# Patient Record
Sex: FEMALE | Race: WHITE | NOT HISPANIC OR LATINO | Employment: FULL TIME | URBAN - METROPOLITAN AREA
[De-identification: names, ages, dates, MRNs, and addresses within clinical notes are randomized per-mention and may not be internally consistent; named-entity substitution may affect disease eponyms.]

---

## 2021-10-22 ENCOUNTER — NURSE ONLY (OUTPATIENT)
Dept: FAMILY MEDICINE | Age: 36
End: 2021-10-22

## 2021-10-22 DIAGNOSIS — Z23 NEED FOR VACCINATION: Primary | ICD-10-CM

## 2021-10-22 PROCEDURE — 90686 IIV4 VACC NO PRSV 0.5 ML IM: CPT

## 2021-10-22 PROCEDURE — 90471 IMMUNIZATION ADMIN: CPT

## 2022-06-24 ENCOUNTER — LAB ENCOUNTER (OUTPATIENT)
Dept: LAB | Age: 37
End: 2022-06-24
Attending: OBSTETRICS & GYNECOLOGY
Payer: COMMERCIAL

## 2022-06-24 ENCOUNTER — OFFICE VISIT (OUTPATIENT)
Dept: OBGYN CLINIC | Facility: CLINIC | Age: 37
End: 2022-06-24
Payer: COMMERCIAL

## 2022-06-24 VITALS
DIASTOLIC BLOOD PRESSURE: 60 MMHG | BODY MASS INDEX: 29.35 KG/M2 | SYSTOLIC BLOOD PRESSURE: 120 MMHG | HEIGHT: 66 IN | WEIGHT: 182.63 LBS

## 2022-06-24 DIAGNOSIS — Z01.419 ENCOUNTER FOR GYNECOLOGICAL EXAMINATION WITHOUT ABNORMAL FINDING: ICD-10-CM

## 2022-06-24 DIAGNOSIS — Z13.220 SCREENING, LIPID: ICD-10-CM

## 2022-06-24 DIAGNOSIS — Z13.1 SCREENING FOR DIABETES MELLITUS (DM): ICD-10-CM

## 2022-06-24 DIAGNOSIS — Z13.29 THYROID DISORDER SCREENING: ICD-10-CM

## 2022-06-24 DIAGNOSIS — R92.2 DENSE BREAST: Primary | ICD-10-CM

## 2022-06-24 LAB
CHOLEST SERPL-MCNC: 181 MG/DL (ref ?–200)
FASTING PATIENT GLUCOSE ANSWER: YES
FASTING PATIENT LIPID ANSWER: YES
GLUCOSE BLD-MCNC: 87 MG/DL (ref 70–99)
HDLC SERPL-MCNC: 69 MG/DL (ref 40–59)
LDLC SERPL CALC-MCNC: 97 MG/DL (ref ?–100)
NONHDLC SERPL-MCNC: 112 MG/DL (ref ?–130)
TRIGL SERPL-MCNC: 80 MG/DL (ref 30–149)
TSI SER-ACNC: 1.92 MIU/ML (ref 0.36–3.74)
VLDLC SERPL CALC-MCNC: 13 MG/DL (ref 0–30)

## 2022-06-24 PROCEDURE — 82947 ASSAY GLUCOSE BLOOD QUANT: CPT

## 2022-06-24 PROCEDURE — 84443 ASSAY THYROID STIM HORMONE: CPT

## 2022-06-24 PROCEDURE — 80061 LIPID PANEL: CPT

## 2022-06-24 RX ORDER — NORETHINDRONE ACETATE AND ETHINYL ESTRADIOL, ETHINYL ESTRADIOL AND FERROUS FUMARATE 1MG-10(24)
1 KIT ORAL DAILY
COMMUNITY
Start: 2022-03-25 | End: 2022-06-24

## 2022-06-24 RX ORDER — NORETHINDRONE ACETATE AND ETHINYL ESTRADIOL, ETHINYL ESTRADIOL AND FERROUS FUMARATE 1MG-10(24)
1 KIT ORAL DAILY
Qty: 84 TABLET | Refills: 4 | Status: SHIPPED | OUTPATIENT
Start: 2022-06-24

## 2022-07-08 LAB — HPV I/H RISK 1 DNA SPEC QL NAA+PROBE: NEGATIVE

## 2022-07-13 ENCOUNTER — PATIENT MESSAGE (OUTPATIENT)
Dept: OBGYN CLINIC | Facility: CLINIC | Age: 37
End: 2022-07-13

## 2022-07-13 NOTE — TELEPHONE ENCOUNTER
From: Grecia Nguyen  To: Jeff Parham MD  Sent: 7/13/2022 5:21 PM CDT  Subject: Birth Control    My insurance will only cover generic LoLo. Can you please submit the Rx for the generic. Thank you!

## 2022-07-13 NOTE — TELEPHONE ENCOUNTER
Call placed to pharmacy on patient behalf. Advised by pharmacy there is no generic for LoLoEstrin. Pharmacist indicates formerly [facility] 1/20 FE is closest comparable Rx . Call placed to patient. Offered patient option of brand LoLoEstrin via wholesome tawnya or Blisovi or other comparable medication. Patient indicates preference for comparable medication. routed to provider for consideration.

## 2022-07-21 RX ORDER — NORETHINDRONE ACETATE AND ETHINYL ESTRADIOL 1MG-20(24)
1 KIT ORAL DAILY
Qty: 84 TABLET | Refills: 4 | Status: SHIPPED | OUTPATIENT
Start: 2022-07-21 | End: 2023-07-21

## 2023-06-27 ENCOUNTER — OFFICE VISIT (OUTPATIENT)
Dept: OBGYN CLINIC | Facility: CLINIC | Age: 38
End: 2023-06-27

## 2023-06-27 ENCOUNTER — LAB ENCOUNTER (OUTPATIENT)
Dept: LAB | Age: 38
End: 2023-06-27
Attending: OBSTETRICS & GYNECOLOGY
Payer: COMMERCIAL

## 2023-06-27 VITALS
WEIGHT: 183 LBS | DIASTOLIC BLOOD PRESSURE: 78 MMHG | HEIGHT: 66 IN | SYSTOLIC BLOOD PRESSURE: 120 MMHG | BODY MASS INDEX: 29.41 KG/M2

## 2023-06-27 DIAGNOSIS — Z12.31 ENCOUNTER FOR SCREENING MAMMOGRAM FOR BREAST CANCER: ICD-10-CM

## 2023-06-27 DIAGNOSIS — Z80.3 FAMILY HISTORY OF BREAST CANCER: ICD-10-CM

## 2023-06-27 DIAGNOSIS — Z13.220 SCREENING, LIPID: ICD-10-CM

## 2023-06-27 DIAGNOSIS — Z01.419 ENCOUNTER FOR WELL WOMAN EXAM WITH ROUTINE GYNECOLOGICAL EXAM: Primary | ICD-10-CM

## 2023-06-27 LAB
CHOLEST SERPL-MCNC: 179 MG/DL (ref ?–200)
FASTING PATIENT LIPID ANSWER: NO
HDLC SERPL-MCNC: 60 MG/DL (ref 40–59)
LDLC SERPL CALC-MCNC: 102 MG/DL (ref ?–100)
NONHDLC SERPL-MCNC: 119 MG/DL (ref ?–130)
TRIGL SERPL-MCNC: 92 MG/DL (ref 30–149)
VLDLC SERPL CALC-MCNC: 15 MG/DL (ref 0–30)

## 2023-06-27 PROCEDURE — 80061 LIPID PANEL: CPT

## 2023-06-27 PROCEDURE — 99395 PREV VISIT EST AGE 18-39: CPT | Performed by: OBSTETRICS & GYNECOLOGY

## 2023-06-27 PROCEDURE — 3078F DIAST BP <80 MM HG: CPT | Performed by: OBSTETRICS & GYNECOLOGY

## 2023-06-27 PROCEDURE — 3008F BODY MASS INDEX DOCD: CPT | Performed by: OBSTETRICS & GYNECOLOGY

## 2023-06-27 PROCEDURE — 3074F SYST BP LT 130 MM HG: CPT | Performed by: OBSTETRICS & GYNECOLOGY

## 2023-09-22 ENCOUNTER — HOSPITAL ENCOUNTER (OUTPATIENT)
Dept: MAMMOGRAPHY | Age: 38
Discharge: HOME | End: 2023-09-22
Attending: OBSTETRICS & GYNECOLOGY
Payer: COMMERCIAL

## 2023-09-22 DIAGNOSIS — Z12.31 ENCOUNTER FOR SCREENING MAMMOGRAM FOR BREAST CANCER: ICD-10-CM

## 2023-09-22 DIAGNOSIS — Z80.3 FAMILY HISTORY OF BREAST CANCER: ICD-10-CM

## 2023-09-22 PROCEDURE — 77067 SCR MAMMO BI INCL CAD: CPT | Performed by: OBSTETRICS & GYNECOLOGY

## 2023-09-22 PROCEDURE — 77063 BREAST TOMOSYNTHESIS BI: CPT | Performed by: OBSTETRICS & GYNECOLOGY

## 2023-10-06 DIAGNOSIS — Z80.3 FAMILY HISTORY OF BREAST CANCER: Primary | ICD-10-CM

## 2023-10-20 ENCOUNTER — HOSPITAL ENCOUNTER (OUTPATIENT)
Dept: MAMMOGRAPHY | Facility: HOSPITAL | Age: 38
Discharge: HOME | End: 2023-10-20
Attending: OBSTETRICS & GYNECOLOGY

## 2023-10-20 DIAGNOSIS — R92.2 INCONCLUSIVE MAMMOGRAM: ICD-10-CM

## 2023-10-20 PROCEDURE — 77066 DX MAMMO INCL CAD BI: CPT | Performed by: OBSTETRICS & GYNECOLOGY

## 2023-10-20 PROCEDURE — 77062 BREAST TOMOSYNTHESIS BI: CPT | Performed by: OBSTETRICS & GYNECOLOGY

## 2023-10-20 PROCEDURE — 76642 ULTRASOUND BREAST LIMITED: CPT | Performed by: OBSTETRICS & GYNECOLOGY

## 2023-10-20 NOTE — IMAGING NOTE
This Breast Care RN assisted Dr. Marco Antonio Trevino with recommendation for a left breast 1 site ultrasound guided biopsy for mass. Procedure reviewed and all questions answered. Emotional and educational support given. On the day of the biopsy, pt instructed to take Tylenol 1000mg PO, eat a light meal & bring or wear a sports bra. Post biopsy care also reviewed with pt to include NO lifting more than 5lbs, no exercising or housework (limit upper body movement) for 24-48 hrs post biopsy. Patient denies blood thinners, bleeding disorders, liver disease, chemo, and pregnancy. Pt verbalized understanding. Our breast center schedulers will be calling to schedule an appt that is convenient for pt.

## 2023-10-26 ENCOUNTER — HOSPITAL ENCOUNTER (OUTPATIENT)
Dept: MAMMOGRAPHY | Facility: HOSPITAL | Age: 38
Discharge: HOME | End: 2023-10-26
Attending: OBSTETRICS & GYNECOLOGY

## 2023-10-26 DIAGNOSIS — N63.20 BREAST MASS, LEFT: ICD-10-CM

## 2023-10-26 PROCEDURE — 88342 IMHCHEM/IMCYTCHM 1ST ANTB: CPT | Performed by: OBSTETRICS & GYNECOLOGY

## 2023-10-26 PROCEDURE — 19083 BX BREAST 1ST LESION US IMAG: CPT | Performed by: OBSTETRICS & GYNECOLOGY

## 2023-10-26 PROCEDURE — 88305 TISSUE EXAM BY PATHOLOGIST: CPT | Performed by: OBSTETRICS & GYNECOLOGY

## 2023-10-26 PROCEDURE — 77065 DX MAMMO INCL CAD UNI: CPT | Performed by: OBSTETRICS & GYNECOLOGY

## 2023-10-26 PROCEDURE — 88341 IMHCHEM/IMCYTCHM EA ADD ANTB: CPT | Performed by: OBSTETRICS & GYNECOLOGY

## 2023-10-30 NOTE — IMAGING NOTE
1400: Spoke with Catherine Lennox post ultrasound guided left breast biopsy. Introduced myself as breast care coordinator and informed Catherine Lennox of the purpose of my call. Name and date of birth verified by patient. Reinforced post biopsy care and instruction. Ms. Romie Hernandez denies any issues with biopsy site- bleeding, drainage, redness, tenderness. Pathology results and recommendations discussed as follows:   Final Diagnosis:   Left breast, 12:00, mass, ultrasound-guided biopsy:  -Focal atypical ductal hyperplasia. 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 See EMR for complete pathology report    Concordance pending. Recommendation-surgical consult    Dr. Rajeev Meyer referring to Dr. Bobie Pallas instructed to make an appointment with Dr Emili Castro. Dr. Pina Quiet office phone number provided. Encouraged Ms. Romie Hernandez to follow-up with Dr. Rajeev Meyer or the breast center if she has questions or concerns prior to her appointment with Dr. Emili Castro. Catherine Lennox verbalized understanding and agreement to the above.

## 2023-11-28 ENCOUNTER — OFFICE VISIT (OUTPATIENT)
Dept: SURGERY | Facility: CLINIC | Age: 38
End: 2023-11-28
Payer: COMMERCIAL

## 2023-11-28 VITALS
HEIGHT: 66 IN | SYSTOLIC BLOOD PRESSURE: 125 MMHG | BODY MASS INDEX: 30.57 KG/M2 | RESPIRATION RATE: 18 BRPM | TEMPERATURE: 97 F | HEART RATE: 78 BPM | WEIGHT: 190.19 LBS | DIASTOLIC BLOOD PRESSURE: 81 MMHG | OXYGEN SATURATION: 99 %

## 2023-11-28 DIAGNOSIS — N60.92 ATYPICAL DUCTAL HYPERPLASIA OF LEFT BREAST: Primary | ICD-10-CM

## 2023-11-28 DIAGNOSIS — Z91.89 AT HIGH RISK FOR BREAST CANCER: ICD-10-CM

## 2023-11-28 DIAGNOSIS — Z80.3 FAMILY HISTORY OF BREAST CANCER: ICD-10-CM

## 2023-11-28 PROCEDURE — 3079F DIAST BP 80-89 MM HG: CPT | Performed by: SURGERY

## 2023-11-28 PROCEDURE — 3074F SYST BP LT 130 MM HG: CPT | Performed by: SURGERY

## 2023-11-28 PROCEDURE — 99244 OFF/OP CNSLTJ NEW/EST MOD 40: CPT | Performed by: SURGERY

## 2023-11-28 PROCEDURE — 3008F BODY MASS INDEX DOCD: CPT | Performed by: SURGERY

## 2023-11-30 PROBLEM — N60.92 ATYPICAL DUCTAL HYPERPLASIA OF LEFT BREAST: Status: ACTIVE | Noted: 2023-11-30

## 2023-11-30 PROBLEM — Z80.3 FAMILY HISTORY OF BREAST CANCER: Status: ACTIVE | Noted: 2023-11-30

## 2024-01-10 ENCOUNTER — HOSPITAL ENCOUNTER (OUTPATIENT)
Dept: MRI IMAGING | Facility: HOSPITAL | Age: 39
Discharge: HOME | End: 2024-01-10
Attending: SURGERY
Payer: COMMERCIAL

## 2024-01-10 DIAGNOSIS — Z80.3 FAMILY HISTORY OF BREAST CANCER: ICD-10-CM

## 2024-01-10 DIAGNOSIS — N60.92 ATYPICAL DUCTAL HYPERPLASIA OF LEFT BREAST: ICD-10-CM

## 2024-01-10 DIAGNOSIS — Z91.89 AT HIGH RISK FOR BREAST CANCER: ICD-10-CM

## 2024-01-10 PROCEDURE — 77049 MRI BREAST C-+ W/CAD BI: CPT | Performed by: SURGERY

## 2024-01-10 PROCEDURE — A9575 INJ GADOTERATE MEGLUMI 0.1ML: HCPCS | Performed by: SURGERY

## 2024-01-10 RX ORDER — GADOTERATE MEGLUMINE 376.9 MG/ML
20 INJECTION INTRAVENOUS
Status: COMPLETED | OUTPATIENT
Start: 2024-01-10 | End: 2024-01-10

## 2024-01-10 RX ADMIN — GADOTERATE MEGLUMINE 17 ML: 376.9 INJECTION INTRAVENOUS at 18:50:00

## 2024-01-12 ENCOUNTER — DOCUMENTATION ONLY (OUTPATIENT)
Dept: SURGERY | Facility: CLINIC | Age: 39
End: 2024-01-12

## 2024-01-12 DIAGNOSIS — N60.92 ATYPICAL DUCTAL HYPERPLASIA OF LEFT BREAST: Primary | ICD-10-CM

## 2024-01-12 NOTE — PATIENT INSTRUCTIONS
Dr. Skylar Sloan  Tel: [phone]  Fax: [phone] [place] [address]  [phone]     Surgery/Procedure: Left breast wire localized excisional biopsy       Anesthesia:   MAC  Surgery Length:   45 minutes CPT:  71054   Wire LOC:   Yes Nuc Med:   No   Margaret Seed:  No       Dx & ICD-10: Atypical ductal hyperplasia of left breast (N60.92).   Radiology Instructions: Left breast, 12 o'clock position, 7 cm from the nipple, butterfly shaped clip, biopsy demonstrates atypical ductal hyperplasia.   _______________________________________________________________________________    Someone must accompany you the day of the procedure to drive you home safely, because of anesthesia.  You will need an adult  to stay with you the first night following your surgery.  You must remove any kind of makeup, acrylic nails, lotions, powders, creams or deodorant.  [facility] ONLY: Pre-admission will give instruct you on when to take Gatorade and Tylenol/acetaminophen prior to your surgery, purchase 2 - 12oz bottles of regular Gatorade (NOT RED/SUGAR FREE). Otherwise, you may not eat or drink anything else after 11PM the night before surgery.    [facility] ONLY: You may not eat or drink anything after midnight the day of your surgery.   Wear comfortable clothing that can be easily removed.  If you wear dentures, contacts lenses, or any prosthesis, you will be asked to remove them.  Do not drink alcohol or smoke 24 hours prior to your procedure.  Bring a picture ID and your insurance card.  Covid-19 testing is no longer required before surgery unless you are experiencing symptoms such as fever, cough, congestion, etc.   The Pre-Admission Testing Department will call the day before to confirm your procedure, give you the time you need to arrive by and directions on where to go. They begin making calls after 2pm, if you are not contacted by 4pm, please call the surgeon's office listed  above.  Do not take any blood thinners at least one week prior to the procedure/surgery. This includes aspirin, baby aspirin, Ibuprofen products, herbal supplements, diet medications, vitamin E, fish oil and green tea supplements. Please check other supplements for these ingredients. *TYLENOL or acetaminophen is acceptable*  If you take Coumadin, Plavix, Xarelto, or Eliquis, please contact your prescribing physician for special instructions on how long to hold. If you take insulin contact your primary care physician for special instructions.  Our surgery scheduler, Lynsey, will be contacting you to discuss surgery dates. If you have any questions related to scheduling your surgery, please reach out to her at [phone].  _____________________________________________________________________  PRE-OPERATIVE TESTING IF INDICATED BELOW  PLEASE COMPLETE ASAP (AT LEAST 14-21 DAYS PRIOR TO SURGERY)  [] CBC [] BMP [] CMP [] EKG    [] PT, PTT, INR [] Cardiac Clearance  [] H&P Medical Clearance [] Chest X-ray     Please call Central Scheduling to schedule an appointment for pre-operative labs/tests @ [phone]  Does the patient have a pacemaker or ICD?           Does the patient have sleep apnea?  [] Yes   [x] No                               [] Yes   [x] No

## 2024-01-16 ENCOUNTER — TELEPHONE (OUTPATIENT)
Dept: GENERAL RADIOLOGY | Facility: HOSPITAL | Age: 39
End: 2024-01-16

## 2024-01-16 ENCOUNTER — TELEPHONE (OUTPATIENT)
Dept: SURGERY | Facility: CLINIC | Age: 39
End: 2024-01-16

## 2024-01-16 DIAGNOSIS — N60.92 ATYPICAL DUCTAL HYPERPLASIA OF LEFT BREAST: Primary | ICD-10-CM

## 2024-01-16 RX ORDER — LIDOCAINE AND PRILOCAINE 25; 25 MG/G; MG/G
CREAM TOPICAL ONCE
Status: CANCELLED | OUTPATIENT
Start: 2024-01-16 | End: 2024-01-16

## 2024-01-16 RX ORDER — RIBOFLAVIN (VITAMIN B2) 100 MG
100 TABLET ORAL DAILY
COMMUNITY

## 2024-01-16 NOTE — TELEPHONE ENCOUNTER
1525: Spoke with Cindy Carrillo   Introduced myself as breast nurse coordinator and informed Ms Carrillo of the purpose of my call.   Discussed localization procedure to be done in the [facility] imaging center prior to surgery. Procedure and flow of the day reviewed. Cindy Carrillo verbalized understanding. No questions at this time.   Encouraged Ms. Carrillo to contact the breast center if she has questions or concerns prior to her surgery date.  Cindy Carrillo verbalized agreement and gratitude for the call.

## 2024-01-16 NOTE — TELEPHONE ENCOUNTER
Calling pt in regards to scheduling surgery.  Informed pt that I have 02/23/2024 available at [facility] with Dr. Sloan.  Pt verbalized understanding and in agreement with date and location.  All questions answered.   Encouraged pt to call or [facility] message office with any other questions or concerns.

## 2024-02-23 ENCOUNTER — HOSPITAL ENCOUNTER (OUTPATIENT)
Facility: HOSPITAL | Age: 39
Setting detail: HOSPITAL OUTPATIENT SURGERY
Discharge: HOME | End: 2024-02-23
Attending: SURGERY | Admitting: SURGERY
Payer: COMMERCIAL

## 2024-02-23 ENCOUNTER — ANESTHESIA EVENT (OUTPATIENT)
Dept: SURGERY | Facility: HOSPITAL | Age: 39
End: 2024-02-23
Payer: COMMERCIAL

## 2024-02-23 ENCOUNTER — HOSPITAL ENCOUNTER (OUTPATIENT)
Dept: MAMMOGRAPHY | Facility: HOSPITAL | Age: 39
Discharge: HOME | End: 2024-02-23
Attending: SURGERY | Admitting: SURGERY
Payer: COMMERCIAL

## 2024-02-23 ENCOUNTER — ANESTHESIA (OUTPATIENT)
Dept: SURGERY | Facility: HOSPITAL | Age: 39
End: 2024-02-23
Payer: COMMERCIAL

## 2024-02-23 VITALS
TEMPERATURE: 98 F | HEIGHT: 66 IN | DIASTOLIC BLOOD PRESSURE: 80 MMHG | RESPIRATION RATE: 16 BRPM | SYSTOLIC BLOOD PRESSURE: 118 MMHG | OXYGEN SATURATION: 100 % | WEIGHT: 188 LBS | BODY MASS INDEX: 30.22 KG/M2 | HEART RATE: 77 BPM

## 2024-02-23 DIAGNOSIS — N60.92 ATYPICAL DUCTAL HYPERPLASIA OF LEFT BREAST: ICD-10-CM

## 2024-02-23 LAB — B-HCG UR QL: NEGATIVE

## 2024-02-23 PROCEDURE — 88305 TISSUE EXAM BY PATHOLOGIST: CPT | Performed by: SURGERY

## 2024-02-23 PROCEDURE — 81025 URINE PREGNANCY TEST: CPT

## 2024-02-23 PROCEDURE — 19281 PERQ DEVICE BREAST 1ST IMAG: CPT | Performed by: SURGERY

## 2024-02-23 PROCEDURE — 76098 X-RAY EXAM SURGICAL SPECIMEN: CPT | Performed by: SURGERY

## 2024-02-23 PROCEDURE — 0HBU0ZX EXCISION OF LEFT BREAST, OPEN APPROACH, DIAGNOSTIC: ICD-10-PCS | Performed by: SURGERY

## 2024-02-23 RX ORDER — SCOLOPAMINE TRANSDERMAL SYSTEM 1 MG/1
1 PATCH, EXTENDED RELEASE TRANSDERMAL ONCE
Status: DISCONTINUED | OUTPATIENT
Start: 2024-02-23 | End: 2024-02-23 | Stop reason: HOSPADM

## 2024-02-23 RX ORDER — ONDANSETRON 2 MG/ML
4 INJECTION INTRAMUSCULAR; INTRAVENOUS EVERY 6 HOURS PRN
Status: DISCONTINUED | OUTPATIENT
Start: 2024-02-23 | End: 2024-02-23

## 2024-02-23 RX ORDER — HYDROCODONE BITARTRATE AND ACETAMINOPHEN 5; 325 MG/1; MG/1
1-2 TABLET ORAL EVERY 6 HOURS PRN
Qty: 20 TABLET | Refills: 0 | Status: SHIPPED | OUTPATIENT
Start: 2024-02-23

## 2024-02-23 RX ORDER — NALOXONE HYDROCHLORIDE 0.4 MG/ML
0.08 INJECTION, SOLUTION INTRAMUSCULAR; INTRAVENOUS; SUBCUTANEOUS AS NEEDED
Status: DISCONTINUED | OUTPATIENT
Start: 2024-02-23 | End: 2024-02-23

## 2024-02-23 RX ORDER — SODIUM CHLORIDE, SODIUM LACTATE, POTASSIUM CHLORIDE, CALCIUM CHLORIDE 600; 310; 30; 20 MG/100ML; MG/100ML; MG/100ML; MG/100ML
INJECTION, SOLUTION INTRAVENOUS CONTINUOUS
Status: DISCONTINUED | OUTPATIENT
Start: 2024-02-23 | End: 2024-02-23

## 2024-02-23 RX ORDER — BUPIVACAINE HYDROCHLORIDE 5 MG/ML
INJECTION, SOLUTION EPIDURAL; INTRACAUDAL AS NEEDED
Status: DISCONTINUED | OUTPATIENT
Start: 2024-02-23 | End: 2024-02-23 | Stop reason: HOSPADM

## 2024-02-23 RX ORDER — GLYCOPYRROLATE 0.2 MG/ML
INJECTION, SOLUTION INTRAMUSCULAR; INTRAVENOUS AS NEEDED
Status: DISCONTINUED | OUTPATIENT
Start: 2024-02-23 | End: 2024-02-23 | Stop reason: SURG

## 2024-02-23 RX ORDER — HYDROCODONE BITARTRATE AND ACETAMINOPHEN 5; 325 MG/1; MG/1
1 TABLET ORAL ONCE AS NEEDED
Status: DISCONTINUED | OUTPATIENT
Start: 2024-02-23 | End: 2024-02-23

## 2024-02-23 RX ORDER — HYDROMORPHONE HYDROCHLORIDE 1 MG/ML
0.2 INJECTION, SOLUTION INTRAMUSCULAR; INTRAVENOUS; SUBCUTANEOUS EVERY 5 MIN PRN
Status: DISCONTINUED | OUTPATIENT
Start: 2024-02-23 | End: 2024-02-23

## 2024-02-23 RX ORDER — ACETAMINOPHEN 500 MG
1000 TABLET ORAL ONCE
Status: DISCONTINUED | OUTPATIENT
Start: 2024-02-23 | End: 2024-02-23 | Stop reason: HOSPADM

## 2024-02-23 RX ORDER — HYDROMORPHONE HYDROCHLORIDE 1 MG/ML
0.6 INJECTION, SOLUTION INTRAMUSCULAR; INTRAVENOUS; SUBCUTANEOUS EVERY 5 MIN PRN
Status: DISCONTINUED | OUTPATIENT
Start: 2024-02-23 | End: 2024-02-23

## 2024-02-23 RX ORDER — CEFAZOLIN SODIUM/WATER 2 G/20 ML
SYRINGE (ML) INTRAVENOUS
Status: DISCONTINUED
Start: 2024-02-23 | End: 2024-02-23 | Stop reason: WASHOUT

## 2024-02-23 RX ORDER — HYDROCODONE BITARTRATE AND ACETAMINOPHEN 5; 325 MG/1; MG/1
2 TABLET ORAL ONCE AS NEEDED
Status: DISCONTINUED | OUTPATIENT
Start: 2024-02-23 | End: 2024-02-23

## 2024-02-23 RX ORDER — ACETAMINOPHEN 500 MG
1000 TABLET ORAL ONCE AS NEEDED
Status: DISCONTINUED | OUTPATIENT
Start: 2024-02-23 | End: 2024-02-23

## 2024-02-23 RX ORDER — PROCHLORPERAZINE EDISYLATE 5 MG/ML
5 INJECTION INTRAMUSCULAR; INTRAVENOUS EVERY 8 HOURS PRN
Status: DISCONTINUED | OUTPATIENT
Start: 2024-02-23 | End: 2024-02-23

## 2024-02-23 RX ORDER — DIAZEPAM 5 MG/1
TABLET ORAL
Status: COMPLETED
Start: 2024-02-23 | End: 2024-02-23

## 2024-02-23 RX ORDER — HYDROMORPHONE HYDROCHLORIDE 1 MG/ML
0.4 INJECTION, SOLUTION INTRAMUSCULAR; INTRAVENOUS; SUBCUTANEOUS EVERY 5 MIN PRN
Status: DISCONTINUED | OUTPATIENT
Start: 2024-02-23 | End: 2024-02-23

## 2024-02-23 RX ORDER — LIDOCAINE HYDROCHLORIDE AND EPINEPHRINE 10; 10 MG/ML; UG/ML
INJECTION, SOLUTION INFILTRATION; PERINEURAL AS NEEDED
Status: DISCONTINUED | OUTPATIENT
Start: 2024-02-23 | End: 2024-02-23 | Stop reason: HOSPADM

## 2024-02-23 RX ORDER — LIDOCAINE HYDROCHLORIDE 10 MG/ML
INJECTION, SOLUTION EPIDURAL; INFILTRATION; INTRACAUDAL; PERINEURAL AS NEEDED
Status: DISCONTINUED | OUTPATIENT
Start: 2024-02-23 | End: 2024-02-23 | Stop reason: SURG

## 2024-02-23 RX ORDER — CEFAZOLIN SODIUM/WATER 2 G/20 ML
2 SYRINGE (ML) INTRAVENOUS ONCE
Status: COMPLETED | OUTPATIENT
Start: 2024-02-23 | End: 2024-02-23

## 2024-02-23 RX ORDER — ONDANSETRON 2 MG/ML
INJECTION INTRAMUSCULAR; INTRAVENOUS AS NEEDED
Status: DISCONTINUED | OUTPATIENT
Start: 2024-02-23 | End: 2024-02-23 | Stop reason: SURG

## 2024-02-23 RX ORDER — DIAZEPAM 5 MG/1
5 TABLET ORAL AS NEEDED
Status: DISCONTINUED | OUTPATIENT
Start: 2024-02-23 | End: 2024-02-23 | Stop reason: HOSPADM

## 2024-02-23 RX ORDER — KETOROLAC TROMETHAMINE 30 MG/ML
INJECTION, SOLUTION INTRAMUSCULAR; INTRAVENOUS AS NEEDED
Status: DISCONTINUED | OUTPATIENT
Start: 2024-02-23 | End: 2024-02-23 | Stop reason: SURG

## 2024-02-23 RX ADMIN — SODIUM CHLORIDE, SODIUM LACTATE, POTASSIUM CHLORIDE, CALCIUM CHLORIDE: 600; 310; 30; 20 INJECTION, SOLUTION INTRAVENOUS at 10:39:00

## 2024-02-23 RX ADMIN — GLYCOPYRROLATE 0.2 MG: 0.2 INJECTION, SOLUTION INTRAMUSCULAR; INTRAVENOUS at 09:51:00

## 2024-02-23 RX ADMIN — CEFAZOLIN SODIUM/WATER 2 G: 2 G/20 ML SYRINGE (ML) INTRAVENOUS at 09:56:00

## 2024-02-23 RX ADMIN — KETOROLAC TROMETHAMINE 30 MG: 30 INJECTION, SOLUTION INTRAMUSCULAR; INTRAVENOUS at 10:24:00

## 2024-02-23 RX ADMIN — LIDOCAINE HYDROCHLORIDE 5 ML: 10 INJECTION, SOLUTION EPIDURAL; INFILTRATION; INTRACAUDAL; PERINEURAL at 09:51:00

## 2024-02-23 RX ADMIN — SODIUM CHLORIDE, SODIUM LACTATE, POTASSIUM CHLORIDE, CALCIUM CHLORIDE: 600; 310; 30; 20 INJECTION, SOLUTION INTRAVENOUS at 09:49:00

## 2024-02-23 RX ADMIN — ONDANSETRON 4 MG: 2 INJECTION INTRAMUSCULAR; INTRAVENOUS at 10:24:00

## 2024-02-23 NOTE — IMAGING NOTE
Assisted  with mammography guided needle localization of the left breast.   Cindy Carrillo identified with spelling of name and date of birth.   Medications and allergies reviewed. NKDA reported.     History: Atypical ductal hyperplasia of left breast   Surgery: Left breast wire localized excisional biopsy     Order verified.  Procedure explained and questions answered. Cindy Carrillo verbalized understanding and agreement.  0817: Written consent obtained.     0824: Scans taken by Grecia- mammography technologist    0827: Dr. Soria present    0827: Time out complete.    0827: Site prepped in a sterile manner by the imaging technologist.   0828: Lidocaine administered for anesthetic affect.  0828: Conner 20G x 3 cm needle placed- left breast  Emotional support provided.  Cindy Carrillo tolerated procedure well.     Site cleaned.  Wire secured with blue clip, steri strips, sterile 4x4 gauze dressing, and Tegaderm.     Cindy Carrillo transported via wheelchair to pre-op/surgery holding in stable condition. Ms. Carrillo without complaints or concerns at this time.

## 2024-02-23 NOTE — ANESTHESIA PREPROCEDURE EVALUATION
PRE-OP EVALUATION    Patient Name: Cindy Carrillo    Admit Diagnosis: Atypical ductal hyperplasia of left breast [N60.92]    Pre-op Diagnosis: Atypical ductal hyperplasia of left breast [N60.92]    Left breast wire localized excisional biopsy    Anesthesia Procedure: Left breast wire localized excisional biopsy (Left)    Surgeon(s) and Role:     * Skylar Sloan MD - Primary    Pre-op vitals reviewed.  Temp: 97.2 °F (36.2 °C)  Pulse: 94  Resp: 16  BP: 127/86  SpO2: 100 %  Body mass index is 30.34 kg/m².    Current medications reviewed.  Hospital Medications:   [MAR Hold] acetaminophen (Tylenol Extra Strength) tab 1,000 mg  1,000 mg Oral Once    [MAR Hold] scopolamine (Transderm-Scop) 1 MG/3DAYS patch 1 patch  1 patch Transdermal Once    lactated ringers infusion   Intravenous Continuous    [MAR Hold] diazePAM (Valium) tab 5 mg  5 mg Oral PRN    ceFAZolin (Ancef) 2 g in 20mL IV syringe premix  2 g Intravenous Once       Outpatient Medications:     Medications Prior to Admission   Medication Sig Dispense Refill Last Dose    BIOTIN OR Take by mouth.   2/9/2024    Multiple Vitamin (MULTIVITAMIN ADULT OR) Take by mouth.   2/19/2024    Ascorbic Acid (VITAMIN C) 100 MG Oral Tab Take 1 tablet (100 mg total) by mouth daily.   2/22/2024    APPLE CIDER VINEGAR OR Take by mouth.   2/9/2024       Allergies: Patient has no known allergies.      Anesthesia Evaluation    Patient summary reviewed.    Anesthetic Complications  (+) history of anesthetic complications  History of: PONV       GI/Hepatic/Renal    Negative GI/hepatic/renal ROS.         (-) chronic renal disease   (-) liver disease                 Cardiovascular        Exercise tolerance: good     MET: >4    (+) obesity                         (-) angina     (-) GAXIOLA         Endo/Other    Negative endo/other ROS.  (-) diabetes     (-) hypothyroidism  (-) hyperthyroidism                     Pulmonary    Negative pulmonary ROS.                       Neuro/Psych    Negative  neuro/psych ROS.                                  Past Surgical History:   Procedure Laterality Date    CHOLECYSTECTOMY  2015    TONSILLECTOMY  2008     Social History     Socioeconomic History    Marital status:    Tobacco Use    Smoking status: Never     Passive exposure: Never    Smokeless tobacco: Never   Vaping Use    Vaping Use: Never used   Substance and Sexual Activity    Alcohol use: Yes     Comment: social    Drug use: Never     History   Drug Use Unknown     Available pre-op labs reviewed.               Airway      Mallampati: III  Mouth opening: >3 FB  TM distance: > 6 cm  Neck ROM: full Cardiovascular    Cardiovascular exam normal.         Dental             Pulmonary    Pulmonary exam normal.                 Other findings              ASA: 1   Plan: MAC  NPO status verified and patient meets guidelines.  Patient has not taken beta blockers in last 24 hours.  Post-procedure pain management plan discussed with surgeon and patient.    Comment:     A detailed discussion about the anesthetic plan was held with Cindy Carrillo in the preoperative area. Benefits and risks of MAC anesthesia were discussed, including intraoperative awareness/recall, PONV, reasonable expectations of post-operative pain/discomfort, aspiration, conversion to general anesthesia, dental injury, pressure/nerve injuries from positioning, and other serious but rare complications (life-threatening cardiopulmonary events). All questions were answered appropriately and patient demonstrated understanding of realistic expectations and risks of undergoing anesthesia. Cindy Carrillo consents to receiving anesthesia and wishes to proceed.  Plan/risks discussed with: patient                Present on Admission:  **None**

## 2024-02-23 NOTE — DISCHARGE INSTRUCTIONS
Breast Surgery  Post-operative Instructions    Skylar Sloan MD  General Instructions  The following instructions will provide helpful information that will assist your recovery. These are designed to be general guidelines. Please remember that everyone heals and recovers differently. Listen to your body and rest when you are tired. If you have any questions or concerns, please do not hesitate to contact my office. I would like to see you in the office about one week after surgery, please schedule and appointment through my office to make a post-operative appointment if you do not already have one.     Restrictions  There are no lifting weight restrictions for the arm on the surgical side. You may gradually increase the amount of weight based on your comfort level. You should avoid a lot of repetitious activity with the arm until the drain is out (if one was placed) and the wound is well-healed (about two weeks).   You should not drive a car until you believe you can react to an emergency situation and you’re no longer taking narcotic pain medications.   You may shower the day after surgery. You should not bathe or swim (i.e. submerge wound) until the wound is well healed (about two weeks).  There are no dietary restrictions.    Exercise  You may begin arm exercises within a couple days. Do these 2 or 3 times per day, beginning with light exercise and gradually increase your range of motion and repetitions. This will help your arm regain full mobility. We will address your activity level again at your post-operative visit.   You will have pain medication prescribed before discharge. Take this as directed to relieve pain. It is important that you be comfortable so that you may continue your stretching exercises.   If you find the medication prescribed is too strong, try Tylenol (Acetaminophen) or Ibuprofen.    Wound Care  You may remove the gauze dressing on the first or second postoperative day and then  shower. You should leave the steri-strips in place; they will start to peel off about 10 days after your surgery. The stitches are all underneath the skin and will dissolve on their own. You will not need any stitches removed except if you have a drain in place.  I encourage you to shower once the outer bandage is removed, you may use soap and water directly over the steri-strips and pat dry following.  You should keep gauze dressing on the wound until the wound is completely dry and without drainage-usually 1-3 days.   If a surgical bra was placed after the surgery, I encourage you to wear it as much as possible during the week following the procedure (including during sleep). Alternatively, you may choose to wear your own bra provided it is comfortable, provides support and does not have an underwire. If the breast doesn’t move it is less painful.  If an elastic bandage was placed around your chest after the surgery you may remove it on the 1st or 2nd day after surgery. If you prefer to leave it on longer, you may.  It is normal to feel a lump in the area of the incisions for up to 6 months. This is part of the healing process. Eventually the breast will return to its normal condition.   You will be given a prescription for a narcotic pain medication (usually Norco) upon discharge. Many patients have very little pain and don’t want to use the narcotic. Don’t be afraid to use it if you’re uncomfortable. If you’d prefer you may substitute Tylenol or Ibuprofen (Motrin, Advil). Next Ibuprofen dose on or after 4:30pm. Using an ice pack for a few minutes over the incision can also alleviate pain. If you do use the narcotic medication, use an over the counter stool softener or gentle laxative and stay well-hydrated as constipation is not uncommon with narcotics.    Pathology Report  The Pathology report is usually available 4-5 business days following the surgery. I will call you  with the results once the report is  available.    Notify my office if:   Your temperature is over 101.5 F   You notice increasing swelling, redness, warmth or drainage from around the incision or drain site.    If you experience any problems please call my office and either my nurse or myself will respond. After hours, you will be forwarded to my answering service which will help you get in touch with myself or the physician covering for me.

## 2024-02-23 NOTE — ANESTHESIA POSTPROCEDURE EVALUATION
[facility]    Cindy Carrillo Patient Status:  Hospital Outpatient Surgery   Age/Gender 39 year old female MRN [id number]   Location [facility] PERIOPERATIVE SERVICE Attending Skylar Sloan MD   Hosp Day # 0 PCP No primary care provider on file.       Anesthesia Post-op Note    Left breast wire localized excisional biopsy    Procedure Summary       Date: 02/23/24 Room / Location:  MAIN OR 10 /  MAIN OR    Anesthesia Start: 0949 Anesthesia Stop: 1039    Procedure: Left breast wire localized excisional biopsy (Left) Diagnosis:       Atypical ductal hyperplasia of left breast      (Atypical ductal hyperplasia of left breast [N60.92])    Surgeons: Skylar Sloan MD Anesthesiologist: Cira Meek MD    Anesthesia Type: MAC ASA Status: 1            Anesthesia Type: MAC    Vitals Value Taken Time   /60 02/23/24 1051   Temp 98 °F (36.7 °C) 02/23/24 1035   Pulse 88 02/23/24 1051   Resp 16 02/23/24 1045   SpO2 100 % 02/23/24 1051   Vitals shown include unfiled device data.    Patient Location: Same Day Surgery    Anesthesia Type: MAC    Airway Patency: patent    Postop Pain Control: adequate    Mental Status: preanesthetic baseline    Nausea/Vomiting: none    Cardiopulmonary/Hydration status: stable euvolemic    Complications: no apparent anesthesia related complications    Postop vital signs: stable    Dental Exam: Unchanged from Preop    Patient to be discharged from PACU when criteria met.

## 2024-02-23 NOTE — H&P
History of Present Illness:   Ms. Cindy Carrillo is a 38 year old woman who presents with imaging detected concern of the left breast.  The patient reports she first felt a lump in her breast when she was in her 20s.  She has a family history of breast cancer.  She reported that she underwent BRCA testing in  that was unremarkable.  She had a palpable mass in  that underwent a right needle biopsy at that time that was reportedly benign.  She has undergone close surveillance since that time in light of her history and family history.  She had a screening mammogram on 2023 that showed extremely dense breast tissue with bilateral asymmetries and was concerned about a possible palpable mass in the right breast at that time.  She therefore was referred for bilateral diagnostic evaluation which took place on 2023 and confirmed a hypoechoic mass in the left breast measuring up to 2.6 cm which biopsy was recommended with no findings to correspond with her palpable concern in the right breast on mammogram or ultrasound.  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  She is here today for evaluation and recommendations for further therapy.             Past Medical History:   Diagnosis Date    Human papilloma virus infection           History reviewed. No pertinent surgical history.     Gynecological History:  Pt is a   Pt was 38 years old at time of first pregnancy.    She has cumulative breastfeeding history of 9 months.  She achieved menarche at age 12 and LMP 2023  She denies any history of hormone replacement therapy   She has history of oral contraceptive use for 20 years, last in .  She denies infertility treatment to achieve pregnancy.     Medications:    No outpatient medications have been marked as taking for the 23 encounter (Appointment) with Nathalia  MD Skylar.         Allergies:    No Known Allergies     Family History:         Family History   Problem Relation Age of Onset    BRCA gene + Paternal Grandmother      Breast Cancer Paternal Grandmother           unknown    BRCA gene + Maternal Aunt      BRCA gene + Paternal Cousin Female           She is not of Ashkenazi [employer]ism ancestry.     Social History:       History   Alcohol Use    Yes       Comment: social             History   Smoking Status    Never   Smokeless Tobacco    Never   Ms. Cindy Carrillo is  with 2 children. She has 1 siblings. She is currently Employed full-time        Review of Systems:  General:   The patient denies, fever, chills, night sweats, fatigue, generalized weakness, change in appetite or weight loss.     HEENT:     The patient denies eye irritation, cataracts, redness, glaucoma, yellowing of the eyes, change in vision, color blindness, or wearing contacts/glasses. The patient denies hearing loss, ringing in the ears, ear drainage, earaches, nasal congestion, nose bleeds, snoring, pain in mouth/throat, hoarseness, change in voice, facial trauma.     Respiratory:  The patient denies chronic cough, phlegm, hemoptysis, pleurisy/chest pain, pneumonia, asthma, wheezing, difficulty in breathing with exertion, emphysema, chronic bronchitis, shortness of breath or abnormal sound when breathing.      Cardiovascular:  There is no history of chest pain, chest pressure/discomfort, palpitations, irregular heartbeat, fainting or near-fainting, difficulty breathing when lying flat, SOB/Coughing at night, swelling of the legs or chest pain while walking.     Breasts:  See history of present illness     Gastrointestinal:     There is no history of difficulty or pain with swallowing, reflux symptoms, vomiting, dark or bloody stools, constipation, yellowing of the skin, indigestion, nausea, change in bowel habits, diarrhea, abdominal pain or vomiting blood.      Genitourinary:  The patient  denies frequent urination, needing to get up at night to urinate, urinary hesitancy or retaining urine, painful urination, urinary incontinence, decreased urine stream, blood in the urine or vaginal/penile discharge.     Skin:    The patient denies rash, itching, skin lesions, dry skin, change in skin color or change in moles.      Hematologic/Lymphatic:  The patient denies easily bruising or bleeding or persistent swollen glands or lymph nodes.      Musculoskeletal:  The patient denies muscle aches/pain, joint pain, stiff joints, neck pain, back pain or bone pain.     Neuropsychiatric:  There is no history of migraines or severe headaches, seizure/epilepsy, speech problems, coordination problems, trembling/tremors, fainting/black outs, dizziness, memory problems, loss of sensation/numbness, problems walking, weakness, tingling or burning in hands/feet. There is no history of abusive relationship, bipolar disorder, sleep disturbance, anxiety, depression or feeling of despair.     Endocrine:    There is no history of poor/slow wound healing, weight loss/gain, fertility or hormone problems, cold intolerance, thyroid disease.      Allergic/Immunologic:  There is no history of hives, hay fever, angioedema or anaphylaxis.     /81 (BP Location: Left arm, Patient Position: Sitting, Cuff Size: adult)   Pulse 78   Temp 97.2 °F (36.2 °C) (Temporal)   Resp 18   Ht 1.676 m (5' 6\")   Wt 86.3 kg (190 lb 3.2 oz)   LMP 06/08/2023   SpO2 99%   BMI 30.70 kg/m²      Physical Exam:  The patient is an alert, oriented, well-nourished and  well-developed woman who appears her stated age. Her speech patterns and movements are normal. Her affect is appropriate.     HEENT: The head is normocephalic. The neck is supple. The thyroid is not enlarged and is without palpable masses/nodules. There are no palpable masses. The trachea is in the midline. Conjunctiva are clear, non-icteric.     Chest: The chest expands symmetrically. The  lungs are clear to auscultation.     Heart: The rhythm is regular.  There are no murmurs, rubs, gallops or thrills.     Breasts:  Her breasts are symmetrical with a cup size 40DD.  Right breast: The skin, nipple ,and areola appear normal. There is no skin dimpling with movement of the pectoralis. There is no nipple retraction. No nipple discharge can be elicited. The parenchyma is mildly nodular. There are no dominant masses in the breast. The axillary tail is normal.  Left breast:   The skin, nipple, and areola appear normal. There is no skin dimpling with movement of the pectoralis. There is no nipple retraction. No nipple discharge can be elicited. The parenchyma is mildly nodular. There are no dominant masses in the breast. The axillary tail is normal.     Abdomen:  The abdomen is soft, flat and non tender. The liver is not enlarged. There are no palpable masses.     Lymph Nodes:  The supraclavicular, axillary and cervical regions are free of significant lymphadenopathy.     Back: There is no vertebral column tenderness.     Skin: The skin appears normal. There are no suspicious appearing rashes or lesions.     Extremities: The extremities are without deformity, cyanosis or edema.     Impression:   Ms. Cindy Carrillo is a 38 year old woman presents with history of breast cancer, dense breast tissue and left breast atypical ductal hyperplasia.     Discussion and Plan:  I had a discussion with the Patient regarding her breast exam. On exam today I found her to be healing well since recent biopsy with no other clinical findings bilaterally.  Specifically, in her right breast I do not palpate any concerns of note and I personally reviewed the recent imaging and pathology we discussed this extensively.     The significance and implications of ADH found on core biopsy with regard to probability of underdiagnosis of DCIS, and with regard to future breast cancer risk was discussed with the patient. For the 1st issue, I  would recommend wire localization and surgical excision of the area in order to obtain a larger tissue sample and exclude co-existing malignancy. I explained to the patient that there was a 20-25% chance of associated DCIS and a 1-2% chance of an associated invasive cancer.  This procedure was explained in detail and she agrees to proceed with surgical excision of the area to exclude these problems.   Prior to surgical excision, I recommended spine breast MRI to exclude any coexisting contralateral concerns and to help delineate extent of excision required for the left breast atypia.  I will contact her with the MRI results when they are available to coordinate the surgical plan.  Patient previously has tested negative for genetic mutation and therefore no additional genetic testing is recommended at this time.   The risks and possible complications of the procedure were explained to the patient and her family and she understood and agreed to the proposed plan. She was given ample opportunity for questions and those questions were answered to her satisfaction. She has been  encouraged to contact the office with any questions or concerns prior to her next appointment.   Pre-op Diagnosis: Atypical ductal hyperplasia of left breast [N60.92]    The above referenced H&P was reviewed by Skylar Sloan MD on 2/23/2024, the patient was examined and no significant changes have occurred in the patient's condition since the H&P was performed.  I discussed with the patient and/or legal representative the potential benefits, risks and side effects of this procedure; the likelihood of the patient achieving goals; and potential problems that might occur during recuperation.  I discussed reasonable alternatives to the procedure, including risks, benefits and side effects related to the alternatives and risks related to not receiving this procedure.  We will proceed with procedure as planned.

## 2024-02-23 NOTE — BRIEF OP NOTE
Pre-Operative Diagnosis: Atypical ductal hyperplasia of left breast [N60.92]     Post-Operative Diagnosis: * No post-op diagnosis entered *      Procedure Performed:   Left breast wire localized excisional biopsy    Surgeon(s) and Role:     * Skylar Sloan MD - Primary    Assistant(s):  Surgical Assistant.: Genna Davis CSA     Surgical Findings: Clip and distortion visualized on specimen radiogram     Specimen: Lumpectomy     Estimated Blood Loss: 5cc    Skylar Sloan MD  2/23/2024  10:18 AM

## 2024-02-26 NOTE — OPERATIVE REPORT
[facility]    PATIENT'S NAME: TERRY BOO   ATTENDING PHYSICIAN: Skylar Sloan M.D.   OPERATING PHYSICIAN: Skylar Sloan M.D.   PATIENT ACCOUNT#:   [id number]    LOCATION:  [facility] 11 EDW 10  MEDICAL RECORD #:   [id number]       YOB: 1985  ADMISSION DATE:       02/23/2024      OPERATION DATE:  02/23/2024    OPERATIVE REPORT    PREOPERATIVE DIAGNOSIS:  Atypical ductal hyperplasia of the left breast.  POSTOPERATIVE DIAGNOSIS:  Atypical ductal hyperplasia of the left breast.  PROCEDURE:  Left breast wire-localized lumpectomy with left breast specimen radiography.    ASSISTANT:  Genna Davis CSA.     ANESTHESIA:  Monitored anesthesia care and local.    ESTIMATED BLOOD LOSS:  5 mL.    DRAINS:  None.    COMPLICATIONS:  None.    DISPOSITION:  Stable on transfer to the recovery room.    INDICATIONS:  This is a 39-year-old female who on imaging was found to have a concerning finding, including a mass, on the left breast, had a biopsy that confirmed focal atypical ductal hyperplasia associated with pseudoangiomatous stromal hyperplasia.  To remove additionally atypia and to exclude concerning malignancy in the area, formal surgical excision has been recommended.  Risks and possible complications were discussed with the patient including, but not limited to, infection, bleeding, injury to surrounding structures, possible need for reoperation.  She agreed to the proposed surgery.    OPERATIVE TECHNIQUE:  The patient was brought to the imaging suite.  She underwent a wire localization of the area of concern in the left breast.  She was then brought to the OR, placed in supine position, properly padded and secured, given a dose of IV antibiotics, and sequential compression devices were applied to the legs for DVT prophylaxis.  Monitored anesthesia care was induced.  The left breast was then prepped and draped in the usual sterile fashion.  Lidocaine 1% with epinephrine was  used to infiltrate the skin and subcutaneous tissues at the targeted incision site.  A curvilinear incision made along the superior areolar border with a 15-blade knife in the skin.  The wire was identified and brought into the field.  Using sharp dissection and electrocautery, a segment of breast tissue surrounding the tip of the wire was carefully excised and oriented with a short stitch single clip superiorly, a long stitch double clip laterally in order to allow for appropriate pathological margins and specimen review.  This was then placed in the imaging device, where specimen x-ray confirmed the presence of the targeted biopsy clip with adequate margins as deemed by myself and a clip was then placed back within the cavity to assist with subsequent surveillance.  This wound was irrigated.  Hemostasis was assured with electrocautery.  Closure was accomplished with interrupted 3-0 Vicryl for deep layer and running 4-0 subcuticular Monocryl for skin.  Mastisol and Steri-Strips were applied.  Marcaine 0.5% was instilled in the cavity to assist with postoperative analgesia.  A sterile dressing and compression bra were placed.  Her blood loss was minimal.  All counts were correct at the conclusion of the procedure.  She tolerated the procedure well.  She was transferred to the recovery area in stable condition.    Dictated By Skylar Sloan M.D.  d: 02/23/2024 11:02:34  t: 02/23/2024 12:55:29  [facility] [id number]  [facility] [place]/    cc: Dr. Georgina Marsh

## 2024-03-07 ENCOUNTER — OFFICE VISIT (OUTPATIENT)
Dept: SURGERY | Facility: CLINIC | Age: 39
End: 2024-03-07
Payer: COMMERCIAL

## 2024-03-07 VITALS
OXYGEN SATURATION: 98 % | DIASTOLIC BLOOD PRESSURE: 77 MMHG | TEMPERATURE: 97 F | WEIGHT: 188.63 LBS | HEIGHT: 66 IN | RESPIRATION RATE: 19 BRPM | BODY MASS INDEX: 30.31 KG/M2 | HEART RATE: 82 BPM | SYSTOLIC BLOOD PRESSURE: 128 MMHG

## 2024-03-07 DIAGNOSIS — N60.92 ATYPICAL DUCTAL HYPERPLASIA OF LEFT BREAST: Primary | ICD-10-CM

## 2024-03-07 PROCEDURE — 99024 POSTOP FOLLOW-UP VISIT: CPT

## 2024-03-07 PROCEDURE — 3078F DIAST BP <80 MM HG: CPT

## 2024-03-07 PROCEDURE — 3008F BODY MASS INDEX DOCD: CPT

## 2024-03-07 PROCEDURE — 3074F SYST BP LT 130 MM HG: CPT

## 2024-03-07 NOTE — PROGRESS NOTES
Breast Surgery Post-Operative Visit    Diagnosis:Atypical ductal hyperplasia, left breast, s/p excisional biopsy on 2/23/2024    Stage: N/A    Disease Status:  Surgical treatment complete, high risk surveillance pending.    History of Present Illness:   Ms. Cindy Carrillo is a 39 year old woman who presents with imaging detected concern of the left breast.  The patient reports she first felt a lump in her breast when she was in her 20s.  She has a family history of breast cancer.  She reported that she underwent BRCA testing in 2010 that was unremarkable.  She had a palpable mass in 2007 that underwent a right needle biopsy at that time that was reportedly benign.  She has undergone close surveillance since that time in light of her history and family history.  She had a screening mammogram on September 22, 2023 that showed extremely dense breast tissue with bilateral asymmetries and was concerned about a possible palpable mass in the right breast at that time.  She therefore was referred for bilateral diagnostic evaluation which took place on October 20, 2023 and confirmed a hypoechoic mass in the left breast measuring up to 2.6 cm which biopsy was recommended with no findings to correspond with her palpable concern in the right breast on mammogram or ultrasound.  The left breast ultrasound-guided biopsy on October 26, 2023 confirmed focal atypical ductal hyperplasia associated with pseudoangiomatous stromal hyperplasia. She underwent left breast excisional biopsy, which occurred without complication. She is here for postoperative visit. She reports her pain is under control. She denies erythema, warmth, drainage, or fevers.   She is here today for evaluation and recommendations for further therapy.        Past Medical History:   Diagnosis Date    Anesthesia complication     Human papilloma virus infection     Hx of motion sickness     Migraines     PONV (postoperative nausea and vomiting)        Past Surgical  History:   Procedure Laterality Date    CHOLECYSTECTOMY      TONSILLECTOMY         Gynecological History:  Pt is a   Pt was 38 years old at time of first pregnancy.    She has cumulative breastfeeding history of 9 months.  She achieved menarche at age 12 and LMP 2023  She denies any history of hormone replacement therapy   She has history of oral contraceptive use for 20 years, last in .  She denies infertility treatment to achieve pregnancy.    Medications:     BIOTIN OR Take by mouth.      Multiple Vitamin (MULTIVITAMIN ADULT OR) Take by mouth.      Ascorbic Acid (VITAMIN C) 100 MG Oral Tab Take 1 tablet (100 mg total) by mouth daily.      APPLE CIDER VINEGAR OR Take by mouth.         Allergies:    No Known Allergies    Family History:   Family History   Problem Relation Age of Onset    BRCA gene + Paternal Grandmother     Breast Cancer Paternal Grandmother         unknown    BRCA gene + Maternal Aunt     BRCA gene + Paternal Cousin Female        She is not of Ashkenazi [employer] ancestry.    Social History:  History   Alcohol Use    Yes     Comment: social       History   Smoking Status    Never   Smokeless Tobacco    Never   Ms. Cindy Carrillo is  with 2 children. She has 1 siblings. She is currently Employed full-time      Review of Systems:  General:   The patient denies, fever, chills, night sweats, fatigue, generalized weakness, change in appetite or weight loss.    HEENT:     The patient denies eye irritation, cataracts, redness, glaucoma, yellowing of the eyes, change in vision, color blindness, or wearing contacts/glasses. The patient denies hearing loss, ringing in the ears, ear drainage, earaches, nasal congestion, nose bleeds, snoring, pain in mouth/throat, hoarseness, change in voice, facial trauma.    Respiratory:  The patient denies chronic cough, phlegm, hemoptysis, pleurisy/chest pain, pneumonia, asthma, wheezing, difficulty in breathing with exertion, emphysema, chronic  bronchitis, shortness of breath or abnormal sound when breathing.     Cardiovascular:  There is no history of chest pain, chest pressure/discomfort, palpitations, irregular heartbeat, fainting or near-fainting, difficulty breathing when lying flat, SOB/Coughing at night, swelling of the legs or chest pain while walking.    Breasts:  See history of present illness    Gastrointestinal:     There is no history of difficulty or pain with swallowing, reflux symptoms, vomiting, dark or bloody stools, constipation, yellowing of the skin, indigestion, nausea, change in bowel habits, diarrhea, abdominal pain or vomiting blood.     Genitourinary:  The patient denies frequent urination, needing to get up at night to urinate, urinary hesitancy or retaining urine, painful urination, urinary incontinence, decreased urine stream, blood in the urine or vaginal/penile discharge.    Skin:    The patient denies rash, itching, skin lesions, dry skin, change in skin color or change in moles.     Hematologic/Lymphatic:  The patient denies easily bruising or bleeding or persistent swollen glands or lymph nodes.     Musculoskeletal:  The patient denies muscle aches/pain, joint pain, stiff joints, neck pain, back pain or bone pain.    Neuropsychiatric:  There is no history of migraines or severe headaches, seizure/epilepsy, speech problems, coordination problems, trembling/tremors, fainting/black outs, dizziness, memory problems, loss of sensation/numbness, problems walking, weakness, tingling or burning in hands/feet. There is no history of abusive relationship, bipolar disorder, sleep disturbance, anxiety, depression or feeling of despair.    Endocrine:    There is no history of poor/slow wound healing, weight loss/gain, fertility or hormone problems, cold intolerance, thyroid disease.     Allergic/Immunologic:  There is no history of hives, hay fever, angioedema or anaphylaxis.    /77 (BP Location: Right arm, Patient Position:  Sitting, Cuff Size: adult)   Pulse 82   Temp 97.4 °F (36.3 °C) (Temporal)   Resp 19   Ht 1.676 m (5' 6\")   Wt 85.5 kg (188 lb 9.6 oz)   LMP 02/21/2024 (Exact Date)   SpO2 98%   BMI 30.44 kg/m²     Physical Exam:  The patient is an alert, oriented, well-nourished and  well-developed woman who appears her stated age. Her speech patterns and movements are normal. Her affect is appropriate.    HEENT: The head is normocephalic. The neck is supple. The thyroid is not enlarged and is without palpable masses/nodules. There are no palpable masses. The trachea is in the midline. Conjunctiva are clear, non-icteric.    Chest: The chest expands symmetrically. The lungs are clear to auscultation.    Heart: The rhythm is regular.  There are no murmurs, rubs, gallops or thrills.    Breasts:  Her breasts are symmetrical with a cup size 40DD.  Right breast: The skin, nipple ,and areola appear normal. There is no skin dimpling with movement of the pectoralis. There is no nipple retraction. No nipple discharge can be elicited. The parenchyma is mildly nodular. There are no dominant masses in the breast. The axillary tail is normal.  Left breast:   The skin, nipple, and areola appear normal. There is no skin dimpling with movement of the pectoralis. There is no nipple retraction. No nipple discharge can be elicited. The parenchyma is mildly nodular. There are no dominant masses in the breast. The axillary tail is normal.    Abdomen:  The abdomen is soft, flat and non tender. The liver is not enlarged. There are no palpable masses.    Lymph Nodes:  The supraclavicular, axillary and cervical regions are free of significant lymphadenopathy.    Back: There is no vertebral column tenderness.    Skin: The skin appears normal. There are no suspicious appearing rashes or lesions.    Extremities: The extremities are without deformity, cyanosis or edema.    Impression:   Ms. Cindy Carrillo is a 39 year old woman presents with history of  breast cancer, dense breast tissue and left breast atypical ductal hyperplasia, s/p excisional biopsy.     Recommendations:   I had a discussion with the Patient regarding her breast exam.  She is healing well since surgery with no signs of infection. I reviewed her pathology. She will follow up with Dr. Sloan for a second post operative visit. She was given ample opportunity for questions and those questions were answered to her satisfaction. She was encouraged to contact the office with any questions or concerns prior to her next scheduled appointment.

## 2024-03-12 ENCOUNTER — OFFICE VISIT (OUTPATIENT)
Dept: SURGERY | Facility: CLINIC | Age: 39
End: 2024-03-12
Payer: COMMERCIAL

## 2024-03-12 VITALS
BODY MASS INDEX: 30 KG/M2 | SYSTOLIC BLOOD PRESSURE: 132 MMHG | OXYGEN SATURATION: 99 % | WEIGHT: 186 LBS | RESPIRATION RATE: 19 BRPM | HEART RATE: 90 BPM | DIASTOLIC BLOOD PRESSURE: 90 MMHG | TEMPERATURE: 98 F

## 2024-03-12 DIAGNOSIS — N60.92 ATYPICAL DUCTAL HYPERPLASIA OF LEFT BREAST: Primary | ICD-10-CM

## 2024-03-12 PROCEDURE — 3075F SYST BP GE 130 - 139MM HG: CPT | Performed by: SURGERY

## 2024-03-12 PROCEDURE — 3080F DIAST BP >= 90 MM HG: CPT | Performed by: SURGERY

## 2024-03-12 PROCEDURE — 99024 POSTOP FOLLOW-UP VISIT: CPT | Performed by: SURGERY

## 2024-03-12 RX ORDER — TAMOXIFEN CITRATE 20 MG/1
20 TABLET ORAL DAILY
Qty: 90 TABLET | Refills: 3 | Status: SHIPPED | OUTPATIENT
Start: 2024-03-12

## 2024-03-12 NOTE — PROGRESS NOTES
Breast Surgery Post-Operative Visit    Diagnosis:Atypical ductal hyperplasia, left breast, s/p excisional biopsy on 2/23/2024    Stage: N/A    Disease Status:  Surgical treatment complete, chemoprevention counseling high risk surveillance pending.    History of Present Illness:   Ms. Cindy Carrillo is a 39 year old woman who presents with imaging detected concern of the left breast.  The patient reports she first felt a lump in her breast when she was in her 20s.  She has a family history of breast cancer.  She reported that she underwent BRCA testing in 2010 that was unremarkable.  She had a palpable mass in 2007 that underwent a right needle biopsy at that time that was reportedly benign.  She has undergone close surveillance since that time in light of her history and family history.  She had a screening mammogram on September 22, 2023 that showed extremely dense breast tissue with bilateral asymmetries and was concerned about a possible palpable mass in the right breast at that time.  She therefore was referred for bilateral diagnostic evaluation which took place on October 20, 2023 and confirmed a hypoechoic mass in the left breast measuring up to 2.6 cm which biopsy was recommended with no findings to correspond with her palpable concern in the right breast on mammogram or ultrasound.  The left breast ultrasound-guided biopsy on October 26, 2023 confirmed focal atypical ductal hyperplasia associated with pseudoangiomatous stromal hyperplasia. She underwent left breast excisional biopsy, which occurred without complication. She is here for postoperative visit. She reports her pain is under control. She denies erythema, warmth, drainage, or fevers. She is here today for evaluation and recommendations for further therapy.        Past Medical History:   Diagnosis Date    Anesthesia complication     Human papilloma virus infection     Hx of motion sickness     Migraines     PONV (postoperative nausea and vomiting)         Past Surgical History:   Procedure Laterality Date    CHOLECYSTECTOMY  2015    TONSILLECTOMY         Gynecological History:  Pt is a   Pt was 38 years old at time of first pregnancy.    She has cumulative breastfeeding history of 9 months.  She achieved menarche at age 12 and LMP 2023  She denies any history of hormone replacement therapy   She has history of oral contraceptive use for 20 years, last in .  She denies infertility treatment to achieve pregnancy.    Medications:    No outpatient medications have been marked as taking for the 3/12/24 encounter (Appointment) with Skylar Sloan MD.       Allergies:    No Known Allergies    Family History:   Family History   Problem Relation Age of Onset    BRCA gene + Paternal Grandmother     Breast Cancer Paternal Grandmother         unknown    BRCA gene + Maternal Aunt     BRCA gene + Paternal Cousin Female        She is not of Ashkenazi Religious ancestry.    Social History:  History   Alcohol Use    Yes     Comment: social       History   Smoking Status    Never   Smokeless Tobacco    Never   Ms. Cindy Carrillo is  with 2 children. She has 1 siblings. She is currently Employed full-time      Review of Systems:  General:   The patient denies, fever, chills, night sweats, fatigue, generalized weakness, change in appetite or weight loss.    HEENT:     The patient denies eye irritation, cataracts, redness, glaucoma, yellowing of the eyes, change in vision, color blindness, or wearing contacts/glasses. The patient denies hearing loss, ringing in the ears, ear drainage, earaches, nasal congestion, nose bleeds, snoring, pain in mouth/throat, hoarseness, change in voice, facial trauma.    Respiratory:  The patient denies chronic cough, phlegm, hemoptysis, pleurisy/chest pain, pneumonia, asthma, wheezing, difficulty in breathing with exertion, emphysema, chronic bronchitis, shortness of breath or abnormal sound when breathing.      Cardiovascular:  There is no history of chest pain, chest pressure/discomfort, palpitations, irregular heartbeat, fainting or near-fainting, difficulty breathing when lying flat, SOB/Coughing at night, swelling of the legs or chest pain while walking.    Breasts:  See history of present illness    Gastrointestinal:     There is no history of difficulty or pain with swallowing, reflux symptoms, vomiting, dark or bloody stools, constipation, yellowing of the skin, indigestion, nausea, change in bowel habits, diarrhea, abdominal pain or vomiting blood.     Genitourinary:  The patient denies frequent urination, needing to get up at night to urinate, urinary hesitancy or retaining urine, painful urination, urinary incontinence, decreased urine stream, blood in the urine or vaginal/penile discharge.    Skin:    The patient denies rash, itching, skin lesions, dry skin, change in skin color or change in moles.     Hematologic/Lymphatic:  The patient denies easily bruising or bleeding or persistent swollen glands or lymph nodes.     Musculoskeletal:  The patient denies muscle aches/pain, joint pain, stiff joints, neck pain, back pain or bone pain.    Neuropsychiatric:  There is no history of migraines or severe headaches, seizure/epilepsy, speech problems, coordination problems, trembling/tremors, fainting/black outs, dizziness, memory problems, loss of sensation/numbness, problems walking, weakness, tingling or burning in hands/feet. There is no history of abusive relationship, bipolar disorder, sleep disturbance, anxiety, depression or feeling of despair.    Endocrine:    There is no history of poor/slow wound healing, weight loss/gain, fertility or hormone problems, cold intolerance, thyroid disease.     Allergic/Immunologic:  There is no history of hives, hay fever, angioedema or anaphylaxis.    /90 (BP Location: Right arm, Patient Position: Sitting, Cuff Size: adult)   Pulse 90   Temp 98.1 °F (36.7 °C)  (Temporal)   Resp 19   Wt 84.4 kg (186 lb)   LMP 02/21/2024 (Exact Date)   SpO2 99%   BMI 30.02 kg/m²     Physical Exam:  The patient is an alert, oriented, well-nourished and  well-developed woman who appears her stated age. Her speech patterns and movements are normal. Her affect is appropriate.    HEENT: The head is normocephalic. The neck is supple. The thyroid is not enlarged and is without palpable masses/nodules. There are no palpable masses. The trachea is in the midline. Conjunctiva are clear, non-icteric.    Chest: The chest expands symmetrically. The lungs are clear to auscultation.    Heart: The rhythm is regular.  There are no murmurs, rubs, gallops or thrills.    Breasts:  Her breasts are symmetrical with a cup size 40DD.  Right breast: The skin, nipple ,and areola appear normal. There is no skin dimpling with movement of the pectoralis. There is no nipple retraction. No nipple discharge can be elicited. The parenchyma is mildly nodular. There are no dominant masses in the breast. The axillary tail is normal.  Left breast:   The skin, nipple, and areola appear normal. There is no skin dimpling with movement of the pectoralis. There is no nipple retraction. No nipple discharge can be elicited. The parenchyma is mildly nodular. There are no dominant masses in the breast. The axillary tail is normal.  There is a well-healed incision with no signs of infection.    Abdomen:  The abdomen is soft, flat and non tender. The liver is not enlarged. There are no palpable masses.    Lymph Nodes:  The supraclavicular, axillary and cervical regions are free of significant lymphadenopathy.    Back: There is no vertebral column tenderness.    Skin: The skin appears normal. There are no suspicious appearing rashes or lesions.    Extremities: The extremities are without deformity, cyanosis or edema.    Impression:   Ms. Cindy Carrillo is a 39 year old woman presents with history of breast cancer, dense breast tissue  and left breast atypical ductal hyperplasia, s/p excisional biopsy.     Recommendations:   I had a discussion with the Patient regarding her breast exam.  She is healing well since surgery with no signs of infection. I personally reviewed her pathology.  The pathology confirmed focal residual atypical ductal hyperplasia.  We discussed the significance and implications including the risk for future breast cancer.  She had remote genetic testing and we will see if she qualifies for additional panel based testing given that her last testing was done in 2011.      Discuss the role of chemoprevention.      Discussed the following data:  --Tamoxifen 20 mg a day for 5 yrs shown to reduce the risk of breast cancer by 49% and among women with h/o atypical hyperplasia, same dose and duration was associated with a risk reduction of 86% reduction in breast cancer risk.  FDA approved the use of Tamoxifen for breast cancer risk reduction in premenopausal women at increased risk for breast cancer based upon the results of the NSABP Breast Cancer Prevention Trial in 1998. The criteria for inclusion included a 5 year risk of breast cancer greater than 1.7% based on the Cynthia Model which in order to establish a favorable risk versus benefit profile.   --Raloxifine at 60 mg for post-menopausal women was found to be equivalent to tamoxifen for breast cancer risk reduction in the initial comparison, in the long-term f/u it appears to be less efficacious in risk reduction than tamoxifen.  Consideration of toxicity may still lead to the choice of raloxifine over tamoxifen in women with intact uterus.     --Exemestane for post-menopausal women in a large single randomizes study in women with LCIS was found to reduce the relative incidence of invasive breast cancer by 65% at 3 yr median f/u.  --Anastrozole for post-menopausal women was found to reduce the relative incidence of breast cancer by 53% at a 5 yr median f/u.     She is inclined to  start tamoxifen for risk reduction and the potential side effects were reviewed in detail.  For high Risk surveillance we will plan for bilateral diagnostic evaluation in September 2024 and we will stagger an annual MRI with this surveillance plan. She was given ample opportunity for questions and those questions were answered to her satisfaction. She was encouraged to contact the office with any questions or concerns prior to her next scheduled appointment.

## 2024-06-06 ENCOUNTER — TELEPHONE (OUTPATIENT)
Dept: OBGYN CLINIC | Facility: CLINIC | Age: 39
End: 2024-06-06

## 2024-06-06 ENCOUNTER — OFFICE VISIT (OUTPATIENT)
Dept: OBGYN CLINIC | Facility: CLINIC | Age: 39
End: 2024-06-06
Payer: COMMERCIAL

## 2024-06-06 VITALS
HEIGHT: 66 IN | DIASTOLIC BLOOD PRESSURE: 84 MMHG | BODY MASS INDEX: 29.87 KG/M2 | SYSTOLIC BLOOD PRESSURE: 120 MMHG | WEIGHT: 185.88 LBS

## 2024-06-06 DIAGNOSIS — N92.0 MENORRHAGIA WITH REGULAR CYCLE: Primary | ICD-10-CM

## 2024-06-06 DIAGNOSIS — Z12.4 SCREENING FOR CERVICAL CANCER: ICD-10-CM

## 2024-06-06 DIAGNOSIS — Z01.419 ENCOUNTER FOR WELL WOMAN EXAM WITH ROUTINE GYNECOLOGICAL EXAM: Primary | ICD-10-CM

## 2024-06-06 DIAGNOSIS — N92.1 MENORRHAGIA WITH IRREGULAR CYCLE: ICD-10-CM

## 2024-06-06 PROCEDURE — 88175 CYTOPATH C/V AUTO FLUID REDO: CPT | Performed by: OBSTETRICS & GYNECOLOGY

## 2024-06-06 RX ORDER — TRANEXAMIC ACID 650 MG/1
1300 TABLET ORAL 3 TIMES DAILY
Qty: 30 TABLET | Refills: 0 | Status: SHIPPED | OUTPATIENT
Start: 2024-06-06

## 2024-06-06 NOTE — TELEPHONE ENCOUNTER
I spoke with patient, confirmed the date and informed her that pre admission testing would call her closer to date with an exact time to arrive. I also sent the patient a minor surgical case letter via [employer].     The surgical case request has been sent     I spoke with Alice KERN, a [facility] representative, and she has initiated the authorization process. Case number is [id number]. Clinicals have been faxed

## 2024-06-06 NOTE — PROGRESS NOTES
[facility]  Obstetrics and Gynecology  Gynecology Visit    Chief Complaint   Patient presents with    Annual           Cindy Carrillo is a 39 year old female who presents for Annual Exam and menorrhagia .    LMP: 6/3/34- started spotting .    Menses regular: 28.    Menstrual flow normal: Extremely Heavy.    Birth control or HRT:  No.   Refill No  Last Pap Smear: 2022.  Any history of abnormal paps: No   Last MM/10/24- breast MRI  Any Medication Refills needed today?: No  Sleep: 5-6 hrs.    Diet: Fair.    Exercise: Walks, swimming, bike riding.   Screening labs/Blood work today: No.     Colonoscopy (if over 44 y/o): n/a.   Gardasil:(age 9-44 y/o) Completed.   Genetic Cancer screen (if indicated): No.   Flu (Aug-April): .TDAP (every 10 years) 1/12/15.      Additional Problems/concerns: Patient reports last few months she has been having period for 1-2 days. Reports day 1 she feels like her \"entire insides\" Comes out, has been wearing birthing pads at night due to heaviness. Has been having to wear tampons and pads and changing hourly.    Next Appt: Will wait tos schedule    Immunization History   Administered Date(s) Administered    Covid-19 Vaccine Moderna 100 mcg/0.5 ml 2021, 2021    Covid-19 Vaccine Pfizer 30 mcg/0.3 ml 2021    FLUZONE 6 months and older PFS 0.5 ml (01400) 2016, 2017, 10/05/2018, 10/10/2019, 2020, 10/22/2021, 10/27/2022    Fluvirin, 3 Years & >, Im 10/11/2013, 2014    HPV (Gardasil) 06/15/2009, 2009, 2009    TDAP 2010, 2013, 2015         Current Outpatient Medications:     BIOTIN OR, Take by mouth., Disp: , Rfl:     Multiple Vitamin (MULTIVITAMIN ADULT OR), Take by mouth., Disp: , Rfl:     Ascorbic Acid (VITAMIN C) 100 MG Oral Tab, Take 1 tablet (100 mg total) by mouth daily., Disp: , Rfl:     APPLE CIDER VINEGAR OR, Take by mouth., Disp: , Rfl:     No Known Allergies    OB History    Para Term   AB Living   2 2 2 0 0 2   SAB IAB Ectopic Multiple Live Births   0 0 0 0 2      # Outcome Date GA Lbr Kalyan/2nd Weight Sex Type Anes PTL Lv   2 Term 04/17/15 39w0d  6 lb 11 oz (3.033 kg) M Vag-Spont   GABINO      Name: Abby   1 Term 2013   8 lb 7 oz (3.827 kg) M Vag-Spont   GABINO      Name: Guy       Past Medical History:    Anesthesia complication    Human papilloma virus infection    Hx of motion sickness    Migraines    PONV (postoperative nausea and vomiting)       Past Surgical History:   Procedure Laterality Date    Breast surgery  2024    Cholecystectomy  2015    Lumpectomy left Left 01/2024    benign results    Tonsillectomy  2008       Family History   Problem Relation Age of Onset    BRCA gene + Paternal Grandmother     Breast Cancer Paternal Grandmother         unknown    BRCA gene + Maternal Aunt     BRCA gene + Paternal Cousin Female         Tobacco  Allergies  Meds  Med Hx  Surg Hx  Fam Hx  Soc Hx        Social History     Socioeconomic History    Marital status:      Spouse name: Not on file    Number of children: Not on file    Years of education: Not on file    Highest education level: Not on file   Occupational History    Not on file   Tobacco Use    Smoking status: Never     Passive exposure: Never    Smokeless tobacco: Never   Vaping Use    Vaping status: Never Used   Substance and Sexual Activity    Alcohol use: Yes     Alcohol/week: 3.0 standard drinks of alcohol     Types: 3 Glasses of wine per week     Comment: social    Drug use: Never    Sexual activity: Not on file   Other Topics Concern    Not on file   Social History Narrative    Not on file     Social Determinants of Health     Financial Resource Strain: Not on file   Food Insecurity: Not on file   Transportation Needs: Not on file   Physical Activity: Not on file   Stress: Not on file   Social Connections: Unknown (3/13/2021)    Received [facility], [facility]    Social Connections      Conversations with friends/family/neighbors per week: Not on file   Housing Stability: Low Risk  (7/7/2021)    [facility], [facility]    Housing Stability     Mortgage Payment Concerns?: Not on file     Number of Places Lived in the Last Year: Not on file     Unstable Housing?: Not on file       /84   Ht 5' 6\" (1.676 m)   Wt 185 lb 13.6 oz (84.3 kg)   LMP 06/03/2024 (Exact Date)   BMI 30.00 kg/m²     Wt Readings from Last 3 Encounters:   06/06/24 185 lb 13.6 oz (84.3 kg)   03/12/24 186 lb (84.4 kg)   03/07/24 188 lb 9.6 oz (85.5 kg)         Health Maintenance   Topic Date Due    Influenza Vaccine (1) 08/01/2021    Screen for Cervical Cancer 11/05/2021    DTaP,Tdap and Td Vaccines (3 - Td or Tdap) 03/18/2025    Hepatitis C Screening Completed    HIV Screening Completed    COVID-19 Vaccine Completed     Review of Systems   General: Present- Feeling well. Not Present- Chills, Fever, Weight Gain and Weight Loss.  HEENT: Not Present- Headache and Sore Throat.  Respiratory: Not Present- Cough, Difficulty Breathing, Hemoptysis and Sputum Production.  Cardiovascular: Not Present- Chest Pain, Elevated Blood Pressure, Fainting / Blacking Out and Shortness of Breath.  Gastrointestinal: Not Present- Constipation, Diarrhea, Nausea and Vomiting.  Female Genitourinary: Not Present- Discharge, Dysuria and Frequency.  Musculoskeletal: Not Present- Leg Cramps and Swelling of Extremities.  Neurological: Not Present- Dizziness and Headaches.  Psychiatric: Not Present- Anxiety and Depression.  Endocrine: Not Present- Appetite Changes, Hair Changes and Thyroid Problems.  Hematology: Not Present- Easy Bruising and Excessive bleeding.  All other systems negative     Physical Exam The physical exam findings are as follows:     General   Mental Status - Alert. General Appearance - Cooperative. Orientation - Oriented X4. Build & Nutrition - Well nourished.    Head and  Neck  Thyroid   Gland Characteristics - normal size and consistency.    Chest and Lung Exam   Inspection:   Chest Wall: - Normal.  Percussion:   Quality and Intensity: - Percussion normal.  Palpation: - Palpation normal.  Auscultation:   Breath sounds: - Normal.  Adventitious sounds: - No Adventitious sounds.    Breast   Nipples: Characteristics - Bilateral - Normal. Discharge - Bilateral - None.  Breast - Bilateral - Symmetric.    Cardiovascular   Auscultation: Rhythm - Regular. Heart Sounds - Normal heart sounds.  Murmurs & Other Heart Sounds: Auscultation of the heart reveals - No Murmurs.    Abdomen   Inspection: Inspection of the abdomen reveals - No Hernias. Incisional scars - no incisional scars.  Palpation/Percussion: Palpation and Percussion of the abdomen reveal - Non Tender and No Palpable abdominal masses.  Liver: - Normal.  Auscultation: Auscultation of the abdomen reveals - Bowel sounds normal.    Female Genitourinary     External Genitalia   Perineum - Normal. Bartholin's Gland - Bilateral - Normal. Clitoris - Normal.  Introitus: Characteristics - No Cystocele, Enterocele or Rectocele. Discharge - None.  Labia Majora: Lesions - Bilateral - None. Characteristics - Bilateral - Normal.  Labia Minora: Lesions - Bilateral - None. Characteristics - Bilateral - Normal.  Urethra: Characteristics - Normal. Discharge - None.  [place] Gland - Bilateral - Normal.  Vulva: Characteristics - Normal. Lesions - None.    Speculum & Bimanual   Vagina:   Vaginal Wall: - Normal.  Vaginal Lesions - None. Vaginal Mucosa - Normal.  Cervix: Characteristics - No Motion tenderness. Discharge - None.  Uterus: Characteristics - Normal. Position - Midposition.  Adnexa: Characteristics - Bilateral - Normal. Masses - No Adnexal Masses.    Rectal   Anorectal Exam: External - normal external exam.    Peripheral Vascular   Upper Extremity:   Palpation: - Pulses bilaterally normal.  Lower Extremity: Inspection - Bilateral - Inspection  Normal.  Palpation: Edema - Bilateral - No edema.    Neurologic   Mental Status: - Normal.    Lymphatic  General Lymphatics   Description - Normal .       Location: Transabdominal   Transvaginal x    Gyn Data: Uterine Size enlarged      Uterine Lining thin      Uterus wnls Y/N y      Adnexa wnls Y/N y bilaterally normal       Adnexa       Right       Left       Sonohysterography no evidence of Uterine fibroids Or polyps.    Impression: schedule hysteroscopy with endometrial ablation      Reviewed options - doesn't do well with hormones, tried iud in past. Will give lysteda now and schedule endometrial ablation   Patient was provided with informed consent for surgery including a review of the proposed surgery and all possibilities.  A discussion of the risks of the procedure, benefits, side effects, and success were addressed.  Alternative treatments were discussed as well.  All questions were answered.  Patient is to proceed with surgery.      1. Encounter for well woman exam with routine gynecological exam    2. Menorrhagia with irregular cycle

## 2024-06-06 NOTE — TELEPHONE ENCOUNTER
SURGERY:  schedule hysteroscopy with endometrial ablation     DATE REQUESTED: Mondays or Fridays when on call     Hosp Stay: Out Pt     Major/Minor: Minor     Anticipated time: 20 min     Anesthesia:  MAC     ASSIST NEEDED:  no     PRE-OP WITH PCP: no     DX:  menorrhagia       Georgina Marsh MD

## 2024-06-13 LAB
.: NORMAL
.: NORMAL

## 2024-06-14 LAB — HPV E6+E7 MRNA CVX QL NAA+PROBE: NEGATIVE

## 2024-06-17 RX ORDER — ACETAMINOPHEN 500 MG
500 TABLET ORAL EVERY 6 HOURS PRN
COMMUNITY

## 2024-06-20 ENCOUNTER — ORDERS FOR ADMISSION (OUTPATIENT)
Dept: OBGYN CLINIC | Facility: CLINIC | Age: 39
End: 2024-06-20

## 2024-06-20 DIAGNOSIS — N92.1 MENORRHAGIA WITH IRREGULAR CYCLE: Primary | ICD-10-CM

## 2024-06-20 PROBLEM — N92.0 MENORRHAGIA: Status: ACTIVE | Noted: 2024-06-06

## 2024-06-21 NOTE — DISCHARGE INSTRUCTIONS
HOME INSTRUCTIONS  Over the counter Motrin 600mg every 6 hours as needed for pain alternating with extra strength tylenol every 6 hours - Call if bleeding soaking a pad in <1 hr or fever >100.6 degrees or pain not resolved with ice or over the counter Motrin. Follow up appointment in 2-3 weeks.     [facility] HOME CARE INSTRUCTIONS: POST-OP ANESTHESIA  The medication that you received for sedation or general anesthesia can last up to 24 hours. Your judgment and reflexes may be altered, even if you feel like your normal self.      We Recommend:   Do not drive any motor vehicle or bicycle   Avoid mowing the lawn, playing sports, or working with power tools/applicances (power saws, electric knives or mixers)   That you have someone stay with you on your first night home   Do not drink alcohol or take sleeping pills or tranquilizers   Do not sign legal documents within 24 hours of your procedure   If you had a nerve block for your surgery, take extra care not to put any pressure on your arm or hand for 24 hours    It is normal:  For you to have a sore throat if you had a breathing tube during surgery (while you were asleep!). The sore throat should get better within 48 hours. You can gargle with warm salt water (1/2 tsp in 4 oz warm water) or use a throat lozenge for comfort  To feel muscle aches or soreness especially in the abdomen, chest or neck. The achy feeling should go away in the next 24 hours  To feel weak, sleepy or \"wiped out\". Your should start feeling better in the next 24 hours.   To experience mild discomforts such as sore lip or tongue, headache, cramps, gas pains or a bloated feeling in your abdomen.   To experience mild back pain or soreness for a day or two if you had spinal or epidural anesthesia.   If you had laparoscopic surgery, to feel shoulder pain or discomfort on the day of surgery.   For some patients to have nausea after surgery/anesthesia    If you feel nausea or experience vomiting:   Try to  move around less.   Eat less than usual or drink only liquids until the next morning   Nausea should resolve in about 24 hours    If you have a problem when you are at home:    Call your surgeons office   Discharge Instructions: After Your Surgery  You’ve just had surgery. During surgery, you were given medicine called anesthesia to keep you relaxed and free of pain. After surgery, you may have some pain or nausea. This is common. Here are some tips for feeling better and getting well after surgery.   Going home  Your healthcare provider will show you how to take care of yourself when you go home. They'll also answer your questions. Have an adult family member or friend drive you home. For the first 24 hours after your surgery:   Don't drive or use heavy equipment.  Don't make important decisions or sign legal papers.  Take medicines as directed.  Don't drink alcohol.  Have someone stay with you, if needed. They can watch for problems and help keep you safe.  Be sure to go to all follow-up visits with your healthcare provider. And rest after your surgery for as long as your provider tells you to.   Coping with pain  If you have pain after surgery, pain medicine will help you feel better. Take it as directed, before pain becomes severe. Also, ask your healthcare provider or pharmacist about other ways to control pain. This might be with heat, ice, or relaxation. And follow any other instructions your surgeon or nurse gives you.      Stay on schedule with your medicine.     Tips for taking pain medicine  To get the best relief possible, remember these points:   Pain medicines can upset your stomach. Taking them with a little food may help.  Most pain relievers taken by mouth need at least 20 to 30 minutes to start to work.  Don't wait till your pain becomes severe before you take your medicine. Try to time your medicine so that you can take it before starting an activity. This might be before you get dressed, go for a  walk, or sit down for dinner.  Constipation is a common side effect of some pain medicines. Call your healthcare provider before taking any medicines such as laxatives or stool softeners to help ease constipation. Also ask if you should skip any foods. Drinking lots of fluids and eating foods such as fruits and vegetables that are high in fiber can also help. Remember, don't take laxatives unless your surgeon has prescribed them.  Drinking alcohol and taking pain medicine can cause dizziness and slow your breathing. It can even be deadly. Don't drink alcohol while taking pain medicine.  Pain medicine can make you react more slowly to things. Don't drive or run machinery while taking pain medicine.  Your healthcare provider may tell you to take acetaminophen to help ease your pain. Ask them how much you're supposed to take each day. Acetaminophen or other pain relievers may interact with your prescription medicines or other over-the-counter (OTC) medicines. Some prescription medicines have acetaminophen and other ingredients in them. Using both prescription and OTC acetaminophen for pain can cause you to accidentally overdose. Read the labels on your OTC medicines with care. This will help you to clearly know the list of ingredients, how much to take, and any warnings. It may also help you not take too much acetaminophen. If you have questions or don't understand the information, ask your pharmacist or healthcare provider to explain it to you before you take the OTC medicine.   Managing nausea  Some people have an upset stomach (nausea) after surgery. This is often because of anesthesia, pain, or pain medicine, less movement of food in the stomach, or the stress of surgery. These tips will help you handle nausea and eat healthy foods as you get better. If you were on a special food plan before surgery, ask your healthcare provider if you should follow it while you get better. Check with your provider on how your  eating should progress. It may depend on the surgery you had. These general tips may help:   Don't push yourself to eat. Your body will tell you when to eat and how much.  Start off with clear liquids and soup. They're easier to digest.  Next try semi-solid foods as you feel ready. These include mashed potatoes, applesauce, and gelatin.  Slowly move to solid foods. Don’t eat fatty, rich, or spicy foods at first.  Don't force yourself to have 3 large meals a day. Instead eat smaller amounts more often.  Take pain medicines with a small amount of solid food, such as crackers or toast. This helps prevent nausea.  When to call your healthcare provider  Call your healthcare provider right away if you have any of these:   You still have too much pain, or the pain gets worse, after taking the medicine. The medicine may not be strong enough. Or there may be a complication from the surgery.  You feel too sleepy, dizzy, or groggy. The medicine may be too strong.  Side effects such as nausea or vomiting. Your healthcare provider may advise taking other medicines to .  Skin changes such as rash, itching, or hives. This may mean you have an allergic reaction. Your provider may advise taking other medicines.  The incision looks different (for instance, part of it opens up).  Bleeding or fluid leaking from the incision site, and weren't told to expect that.  Fever of 100.4°F (38°C) or higher, or as directed by your provider.  Call 911  Call 911 right away if you have:   Trouble breathing  Facial swelling    If you have obstructive sleep apnea   You were given anesthesia medicine during surgery to keep you comfortable and free of pain. After surgery, you may have more apnea spells because of this medicine and other medicines you were given. The spells may last longer than normal.    At home:  Keep using the continuous positive airway pressure (CPAP) device when you sleep. Unless your healthcare provider tells you not to, use it  when you sleep, day or night. CPAP is a common device used to treat obstructive sleep apnea.  Talk with your provider before taking any pain medicine, muscle relaxants, or sedatives. Your provider will tell you about the possible dangers of taking these medicines.  Contact your provider if your sleeping changes a lot even when taking medicines as directed.  Alejandro last reviewed this educational content on 10/1/2021  © 1593-1556 [employer]. All rights reserved. This information is not intended as a substitute for professional medical care. Always follow your healthcare professional's instructions.

## 2024-06-21 NOTE — H&P
[facility], [address] [place] [address]        HISTORY AND PHYSICAL        Subjective   Chief Complaint:  Menorrhgia       History of Present Illness:    Cindy Carrillo is a  39 year old y/o  who presents for scheduled gyn procedure Hysteroscopy D&C with endometrial ablation .  The patients complaints include menorrhagia .          Past Medical History:    Anesthesia complication    Human papilloma virus infection    Hx of motion sickness    Migraines    PONV (postoperative nausea and vomiting)       Past Surgical History:   Procedure Laterality Date    Breast surgery      Cholecystectomy      Create eardrum opening,gen anesth Bilateral     as a child    Lumpectomy left Left 2024    benign results    Tonsillectomy         OB History    Para Term  AB Living   2 2 2 0 0 2   SAB IAB Ectopic Multiple Live Births   0 0 0 0 2       No Known Allergies      Current Outpatient Medications:     acetaminophen 500 MG Oral Tab, Take 1 tablet (500 mg total) by mouth every 6 (six) hours as needed for Pain., Disp: , Rfl:     BIOTIN OR, Take 1 tablet by mouth daily., Disp: , Rfl:     Multiple Vitamin (MULTIVITAMIN ADULT OR), Take 1 tablet by mouth daily., Disp: , Rfl:     Ascorbic Acid (VITAMIN C) 100 MG Oral Tab, Take 1 tablet (100 mg total) by mouth daily., Disp: , Rfl:     APPLE CIDER VINEGAR OR, Take 1 Dose by mouth daily., Disp: , Rfl:       Family History   Problem Relation Age of Onset    Hypertension Father     Hypertension Mother     BRCA gene + Paternal Grandmother     Breast Cancer Paternal Grandmother         unknown    BRCA gene + Maternal Aunt     BRCA gene + Paternal Cousin Female          REVIEW OF SYSTEMS:   CONSTITUTIONAL: Negative for fever, chills, diaphoresis, weakness, fatigue, weight loss, weight gain.  ALLERGIES: Negative for urticaria, hay fever, angioedema  EYES: Negative for blurry vision, decreased vision, loss of vision, eye pain, diplopia, photophobia,  discharge  ENT: Negative for sore throat, nasal congestion, nasal discharge, epistaxis, tinnitus, hearing loss  CARDIOVASCULAR: Negative for chest pain, dyspnea on exertion, orthopnea, paroxysmal nocturnal dyspnea, edema, palpitations  RESPIRATORY: Negative for cough, hemoptysis, shortness of breath, pleuritic chest pain, wheezing  BREAST:  Denies breast mass, breast pain, nipple discharge or nipple pain.  ENDOCRINE: Negative for polydipsia/polyuria, palpitations, skin changes, temperature intolerance, unexpected weight changes  HEME-LYMPH: Negative for swollen lymph nodes, bleeding, bruising  GI: Negative abdominal pain, flank pain, nausea, vomiting, diarrhea, constipation, black stool, blood in stool  : Negative for dysuria, frequency/urgency, hematuria, genital discharge, vaginal bleeding, irregular menses, heavy menses, pelvic pain  NEURO: Negative for dizzy/vertigo, headache, focal weakness, numbness/tingling, speech problems, loss of consciousness, confusion, memory loss  MUSCULOSKELETAL: Negative for back pain, joint pain, joint stiffness, joint swelling, muscle pain, muscle weakness  SKIN: Negative for rash, itching, hives  PSYCH: Negative for anxiety, depression, physical abuse, sexual abuse      PHYSICAL EXAM:    [facility] 06/03/2024 (Exact Date)        General   Mental Status - Alert. General Appearance - Cooperative. Orientation - Oriented X4. Build & Nutrition - Well nourished.    Head and Neck  Thyroid   Gland Characteristics - normal size and consistency.    Chest and Lung Exam   Inspection:   Chest Wall: - Normal.  Percussion:   Quality and Intensity: - Percussion normal.  Palpation: - Palpation normal.  Auscultation:   Breath sounds: - Normal.  Adventitious sounds: - No Adventitious sounds.      Cardiovascular   Auscultation: Rhythm - Regular. Heart Sounds - Normal heart sounds.  Murmurs & Other Heart Sounds: Auscultation of the heart reveals - No Murmurs.      Abdomen   Inspection: Inspection of the  abdomen reveals - No Hernias. Incisional scars - No incisional scars.  Palpation/Percussion: Palpation and Percussion of the abdomen reveal - Non Tender and No Palpable abdominal masses.  Liver: - Normal.  Auscultation: Auscultation of the abdomen reveals - Bowel sounds normal.      Female Genitourinary     External Genitalia   Perineum - Normal. Bartholin's Gland - Bilateral - Normal. Clitoris - Normal.  Introitus: Characteristics - No Cystocele, Enterocele or Rectocele. Discharge - None.  Labia Majora: Lesions - Bilateral - None. Characteristics - Bilateral - Normal.  Labia Minora: Lesions - Bilateral - None. Characteristics - Bilateral - Normal.  Urethra: Characteristics - Normal. Discharge - None.  [place] Gland - Bilateral - Normal.  Vulva: Characteristics - Normal. Lesions - None.    Speculum & Bimanual   Vagina:   Vaginal Wall: - Normal.  Vaginal Lesions - None. Vaginal Mucosa - Normal.  Cervix: Characteristics - No Motion tenderness. Discharge - None.  Uterus: Characteristics - Normal. Position - Midposition.  Adnexa: Characteristics - Bilateral - Normal. Masses - No Adnexal Masses.      Peripheral Vascular   Upper Extremity:   Palpation: - Pulses bilaterally normal.  Lower Extremity: Inspection - Bilateral - Inspection Normal.  Palpation: Edema - Bilateral - No edema.      Neurologic   Mental Status: - Normal.      Lymphatic  General Lymphatics   Description - Normal .      No results found for: \"WBC\", \"HGB\", \"HCT\", \"PLT\", \"MCV\", \"RDW\"  No components found for: \"ABOGROUP\", \"RHTYPE\", \"RUBIGG\"            Assessment and Plan:      Menorrhagia         The patient was counseled regarding surgery and the procedure (Hysteroscopy D&C with endometrial ablation ) was reviewed at length.   Risks of procedure including bleeding/need for blood transfusion (<1%), infection (5-10%), damage to other organs/bowel/bladder/ureters (<1%),  and anesthesia were reviewed.  Benefits, alternatives, & indications were also discussed.   All questions were answered.  Written information was provided.      Georgina Marsh MD

## 2024-06-21 NOTE — H&P (VIEW-ONLY)
[facility], [address] [place] [address]        HISTORY AND PHYSICAL        Subjective   Chief Complaint:  Menorrhgia       History of Present Illness:    Cindy Carrillo is a  39 year old y/o  who presents for scheduled gyn procedure Hysteroscopy D&C with endometrial ablation .  The patients complaints include menorrhagia .          Past Medical History:    Anesthesia complication    Human papilloma virus infection    Hx of motion sickness    Migraines    PONV (postoperative nausea and vomiting)       Past Surgical History:   Procedure Laterality Date    Breast surgery      Cholecystectomy      Create eardrum opening,gen anesth Bilateral     as a child    Lumpectomy left Left 2024    benign results    Tonsillectomy         OB History    Para Term  AB Living   2 2 2 0 0 2   SAB IAB Ectopic Multiple Live Births   0 0 0 0 2       No Known Allergies      Current Outpatient Medications:     acetaminophen 500 MG Oral Tab, Take 1 tablet (500 mg total) by mouth every 6 (six) hours as needed for Pain., Disp: , Rfl:     BIOTIN OR, Take 1 tablet by mouth daily., Disp: , Rfl:     Multiple Vitamin (MULTIVITAMIN ADULT OR), Take 1 tablet by mouth daily., Disp: , Rfl:     Ascorbic Acid (VITAMIN C) 100 MG Oral Tab, Take 1 tablet (100 mg total) by mouth daily., Disp: , Rfl:     APPLE CIDER VINEGAR OR, Take 1 Dose by mouth daily., Disp: , Rfl:       Family History   Problem Relation Age of Onset    Hypertension Father     Hypertension Mother     BRCA gene + Paternal Grandmother     Breast Cancer Paternal Grandmother         unknown    BRCA gene + Maternal Aunt     BRCA gene + Paternal Cousin Female          REVIEW OF SYSTEMS:   CONSTITUTIONAL: Negative for fever, chills, diaphoresis, weakness, fatigue, weight loss, weight gain.  ALLERGIES: Negative for urticaria, hay fever, angioedema  EYES: Negative for blurry vision, decreased vision, loss of vision, eye pain, diplopia, photophobia,  discharge  ENT: Negative for sore throat, nasal congestion, nasal discharge, epistaxis, tinnitus, hearing loss  CARDIOVASCULAR: Negative for chest pain, dyspnea on exertion, orthopnea, paroxysmal nocturnal dyspnea, edema, palpitations  RESPIRATORY: Negative for cough, hemoptysis, shortness of breath, pleuritic chest pain, wheezing  BREAST:  Denies breast mass, breast pain, nipple discharge or nipple pain.  ENDOCRINE: Negative for polydipsia/polyuria, palpitations, skin changes, temperature intolerance, unexpected weight changes  HEME-LYMPH: Negative for swollen lymph nodes, bleeding, bruising  GI: Negative abdominal pain, flank pain, nausea, vomiting, diarrhea, constipation, black stool, blood in stool  : Negative for dysuria, frequency/urgency, hematuria, genital discharge, vaginal bleeding, irregular menses, heavy menses, pelvic pain  NEURO: Negative for dizzy/vertigo, headache, focal weakness, numbness/tingling, speech problems, loss of consciousness, confusion, memory loss  MUSCULOSKELETAL: Negative for back pain, joint pain, joint stiffness, joint swelling, muscle pain, muscle weakness  SKIN: Negative for rash, itching, hives  PSYCH: Negative for anxiety, depression, physical abuse, sexual abuse      PHYSICAL EXAM:    [facility] 06/03/2024 (Exact Date)        General   Mental Status - Alert. General Appearance - Cooperative. Orientation - Oriented X4. Build & Nutrition - Well nourished.    Head and Neck  Thyroid   Gland Characteristics - normal size and consistency.    Chest and Lung Exam   Inspection:   Chest Wall: - Normal.  Percussion:   Quality and Intensity: - Percussion normal.  Palpation: - Palpation normal.  Auscultation:   Breath sounds: - Normal.  Adventitious sounds: - No Adventitious sounds.      Cardiovascular   Auscultation: Rhythm - Regular. Heart Sounds - Normal heart sounds.  Murmurs & Other Heart Sounds: Auscultation of the heart reveals - No Murmurs.      Abdomen   Inspection: Inspection of the  abdomen reveals - No Hernias. Incisional scars - No incisional scars.  Palpation/Percussion: Palpation and Percussion of the abdomen reveal - Non Tender and No Palpable abdominal masses.  Liver: - Normal.  Auscultation: Auscultation of the abdomen reveals - Bowel sounds normal.      Female Genitourinary     External Genitalia   Perineum - Normal. Bartholin's Gland - Bilateral - Normal. Clitoris - Normal.  Introitus: Characteristics - No Cystocele, Enterocele or Rectocele. Discharge - None.  Labia Majora: Lesions - Bilateral - None. Characteristics - Bilateral - Normal.  Labia Minora: Lesions - Bilateral - None. Characteristics - Bilateral - Normal.  Urethra: Characteristics - Normal. Discharge - None.  [place] Gland - Bilateral - Normal.  Vulva: Characteristics - Normal. Lesions - None.    Speculum & Bimanual   Vagina:   Vaginal Wall: - Normal.  Vaginal Lesions - None. Vaginal Mucosa - Normal.  Cervix: Characteristics - No Motion tenderness. Discharge - None.  Uterus: Characteristics - Normal. Position - Midposition.  Adnexa: Characteristics - Bilateral - Normal. Masses - No Adnexal Masses.      Peripheral Vascular   Upper Extremity:   Palpation: - Pulses bilaterally normal.  Lower Extremity: Inspection - Bilateral - Inspection Normal.  Palpation: Edema - Bilateral - No edema.      Neurologic   Mental Status: - Normal.      Lymphatic  General Lymphatics   Description - Normal .      No results found for: \"WBC\", \"HGB\", \"HCT\", \"PLT\", \"MCV\", \"RDW\"  No components found for: \"ABOGROUP\", \"RHTYPE\", \"RUBIGG\"            Assessment and Plan:      Menorrhagia         The patient was counseled regarding surgery and the procedure (Hysteroscopy D&C with endometrial ablation ) was reviewed at length.   Risks of procedure including bleeding/need for blood transfusion (<1%), infection (5-10%), damage to other organs/bowel/bladder/ureters (<1%),  and anesthesia were reviewed.  Benefits, alternatives, & indications were also discussed.   All questions were answered.  Written information was provided.      Georgina Marsh MD

## 2024-06-24 ENCOUNTER — ANESTHESIA EVENT (OUTPATIENT)
Dept: SURGERY | Facility: HOSPITAL | Age: 39
End: 2024-06-24

## 2024-06-24 ENCOUNTER — HOSPITAL ENCOUNTER (OUTPATIENT)
Facility: HOSPITAL | Age: 39
Setting detail: HOSPITAL OUTPATIENT SURGERY
Discharge: HOME | End: 2024-06-24
Attending: OBSTETRICS & GYNECOLOGY | Admitting: OBSTETRICS & GYNECOLOGY

## 2024-06-24 ENCOUNTER — ANESTHESIA (OUTPATIENT)
Dept: SURGERY | Facility: HOSPITAL | Age: 39
End: 2024-06-24

## 2024-06-24 VITALS
BODY MASS INDEX: 29.25 KG/M2 | HEIGHT: 66 IN | OXYGEN SATURATION: 98 % | HEART RATE: 58 BPM | TEMPERATURE: 98 F | WEIGHT: 182 LBS | SYSTOLIC BLOOD PRESSURE: 114 MMHG | DIASTOLIC BLOOD PRESSURE: 65 MMHG | RESPIRATION RATE: 14 BRPM

## 2024-06-24 DIAGNOSIS — N92.0 MENORRHAGIA WITH REGULAR CYCLE: ICD-10-CM

## 2024-06-24 LAB — B-HCG UR QL: NEGATIVE

## 2024-06-24 PROCEDURE — 0U5B8ZZ DESTRUCTION OF ENDOMETRIUM, VIA NATURAL OR ARTIFICIAL OPENING ENDOSCOPIC: ICD-10-PCS | Performed by: OBSTETRICS & GYNECOLOGY

## 2024-06-24 PROCEDURE — 58563 HYSTEROSCOPY ABLATION: CPT | Performed by: OBSTETRICS & GYNECOLOGY

## 2024-06-24 RX ORDER — KETOROLAC TROMETHAMINE 30 MG/ML
INJECTION, SOLUTION INTRAMUSCULAR; INTRAVENOUS AS NEEDED
Status: DISCONTINUED | OUTPATIENT
Start: 2024-06-24 | End: 2024-06-24 | Stop reason: SURG

## 2024-06-24 RX ORDER — NALOXONE HYDROCHLORIDE 0.4 MG/ML
0.08 INJECTION, SOLUTION INTRAMUSCULAR; INTRAVENOUS; SUBCUTANEOUS AS NEEDED
Status: DISCONTINUED | OUTPATIENT
Start: 2024-06-24 | End: 2024-06-24

## 2024-06-24 RX ORDER — MORPHINE SULFATE 10 MG/ML
6 INJECTION, SOLUTION INTRAMUSCULAR; INTRAVENOUS EVERY 10 MIN PRN
Status: DISCONTINUED | OUTPATIENT
Start: 2024-06-24 | End: 2024-06-24

## 2024-06-24 RX ORDER — HYDROMORPHONE HYDROCHLORIDE 1 MG/ML
0.4 INJECTION, SOLUTION INTRAMUSCULAR; INTRAVENOUS; SUBCUTANEOUS EVERY 5 MIN PRN
Status: DISCONTINUED | OUTPATIENT
Start: 2024-06-24 | End: 2024-06-24

## 2024-06-24 RX ORDER — DIPHENHYDRAMINE HYDROCHLORIDE 50 MG/ML
INJECTION INTRAMUSCULAR; INTRAVENOUS AS NEEDED
Status: DISCONTINUED | OUTPATIENT
Start: 2024-06-24 | End: 2024-06-24 | Stop reason: SURG

## 2024-06-24 RX ORDER — MORPHINE SULFATE 4 MG/ML
2 INJECTION, SOLUTION INTRAMUSCULAR; INTRAVENOUS EVERY 10 MIN PRN
Status: DISCONTINUED | OUTPATIENT
Start: 2024-06-24 | End: 2024-06-24

## 2024-06-24 RX ORDER — SODIUM CHLORIDE, SODIUM LACTATE, POTASSIUM CHLORIDE, CALCIUM CHLORIDE 600; 310; 30; 20 MG/100ML; MG/100ML; MG/100ML; MG/100ML
INJECTION, SOLUTION INTRAVENOUS CONTINUOUS
Status: DISCONTINUED | OUTPATIENT
Start: 2024-06-24 | End: 2024-06-24

## 2024-06-24 RX ORDER — MIDAZOLAM HYDROCHLORIDE 1 MG/ML
INJECTION INTRAMUSCULAR; INTRAVENOUS AS NEEDED
Status: DISCONTINUED | OUTPATIENT
Start: 2024-06-24 | End: 2024-06-24 | Stop reason: SURG

## 2024-06-24 RX ORDER — HYDROMORPHONE HYDROCHLORIDE 1 MG/ML
0.2 INJECTION, SOLUTION INTRAMUSCULAR; INTRAVENOUS; SUBCUTANEOUS EVERY 5 MIN PRN
Status: DISCONTINUED | OUTPATIENT
Start: 2024-06-24 | End: 2024-06-24

## 2024-06-24 RX ORDER — ONDANSETRON 2 MG/ML
INJECTION INTRAMUSCULAR; INTRAVENOUS AS NEEDED
Status: DISCONTINUED | OUTPATIENT
Start: 2024-06-24 | End: 2024-06-24 | Stop reason: SURG

## 2024-06-24 RX ORDER — MORPHINE SULFATE 4 MG/ML
4 INJECTION, SOLUTION INTRAMUSCULAR; INTRAVENOUS EVERY 10 MIN PRN
Status: DISCONTINUED | OUTPATIENT
Start: 2024-06-24 | End: 2024-06-24

## 2024-06-24 RX ORDER — LIDOCAINE HYDROCHLORIDE 10 MG/ML
INJECTION, SOLUTION EPIDURAL; INFILTRATION; INTRACAUDAL; PERINEURAL AS NEEDED
Status: DISCONTINUED | OUTPATIENT
Start: 2024-06-24 | End: 2024-06-24 | Stop reason: SURG

## 2024-06-24 RX ORDER — HYDROMORPHONE HYDROCHLORIDE 1 MG/ML
0.6 INJECTION, SOLUTION INTRAMUSCULAR; INTRAVENOUS; SUBCUTANEOUS EVERY 5 MIN PRN
Status: DISCONTINUED | OUTPATIENT
Start: 2024-06-24 | End: 2024-06-24

## 2024-06-24 RX ORDER — ACETAMINOPHEN 500 MG
1000 TABLET ORAL ONCE
Status: COMPLETED | OUTPATIENT
Start: 2024-06-24 | End: 2024-06-24

## 2024-06-24 RX ADMIN — SODIUM CHLORIDE, SODIUM LACTATE, POTASSIUM CHLORIDE, CALCIUM CHLORIDE: 600; 310; 30; 20 INJECTION, SOLUTION INTRAVENOUS at 09:56:00

## 2024-06-24 RX ADMIN — MIDAZOLAM HYDROCHLORIDE 2 MG: 1 INJECTION INTRAMUSCULAR; INTRAVENOUS at 09:36:00

## 2024-06-24 RX ADMIN — KETOROLAC TROMETHAMINE 30 MG: 30 INJECTION, SOLUTION INTRAMUSCULAR; INTRAVENOUS at 09:55:00

## 2024-06-24 RX ADMIN — LIDOCAINE HYDROCHLORIDE 50 MG: 10 INJECTION, SOLUTION EPIDURAL; INFILTRATION; INTRACAUDAL; PERINEURAL at 09:40:00

## 2024-06-24 RX ADMIN — DIPHENHYDRAMINE HYDROCHLORIDE 12.5 MG: 50 INJECTION INTRAMUSCULAR; INTRAVENOUS at 09:36:00

## 2024-06-24 RX ADMIN — SODIUM CHLORIDE, SODIUM LACTATE, POTASSIUM CHLORIDE, CALCIUM CHLORIDE: 600; 310; 30; 20 INJECTION, SOLUTION INTRAVENOUS at 09:36:00

## 2024-06-24 RX ADMIN — ONDANSETRON 4 MG: 2 INJECTION INTRAMUSCULAR; INTRAVENOUS at 09:55:00

## 2024-06-24 NOTE — ANESTHESIA POSTPROCEDURE EVALUATION
Patient: Cindy Carrillo    Procedure Summary       Date: 06/24/24 Room / Location: [facility] MAIN OR [address] [place] [address] MAIN OR    Anesthesia Start: 0936 Anesthesia Stop: 1005    Procedure: Hysteroscopy with endometrial ablation (Vagina ) Diagnosis:       Menorrhagia with regular cycle      (Menorrhagia with regular cycle [N92.0])    Surgeons: Georgina Marsh MD Anesthesiologist: Adan Mi MD    Anesthesia Type: MAC ASA Status: 1            Anesthesia Type: MAC    Vitals Value Taken Time   /72 06/24/24 1005   Temp 97.4 °F (36.3 °C) 06/24/24 1005   Pulse 75 06/24/24 1005   Resp 14 06/24/24 1005   SpO2 97 % 06/24/24 1005   Vitals shown include unfiled device data.    EM AN Post Evaluation:   Patient Evaluated in PACU  Patient Participation: complete - patient participated  Level of Consciousness: awake  Pain Score: 0  Pain Management: adequate  Airway Patency:patent  Dental exam unchanged from preop  Yes    Nausea/Vomiting: none  Cardiovascular Status: acceptable  Respiratory Status: acceptable and nasal cannula  Postoperative Hydration acceptable    LEXI GUILLEN CRNA  6/24/2024 10:05 AM

## 2024-06-24 NOTE — ANESTHESIA PREPROCEDURE EVALUATION
Anesthesia PreOp Note    HPI:     Cindy Carrillo is a 39 year old female who presents for preoperative consultation requested by: Georgina Marsh MD    Date of Surgery: 6/24/2024    Procedure(s):  Hysteroscopy with endometrial ablation  Indication: Menorrhagia with regular cycle [N92.0]    Relevant Problems   No relevant active problems       NPO:  Last Liquid Consumption Date: 06/23/24  Last Liquid Consumption Time: 2300  Last Solid Consumption Date: 06/23/24  Last Solid Consumption Time: 2000  Last Liquid Consumption Date: 06/23/24          History Review:  Patient Active Problem List    Diagnosis Date Noted    Menorrhagia 06/06/2024    Atypical ductal hyperplasia of left breast 11/30/2023    Family history of breast cancer 11/30/2023    Vaginitis and vulvovaginitis 02/14/2014    Diffuse cystic mastopathy 11/01/2012    Cervical high risk human papillomavirus (HPV) DNA test positive 08/08/2011    Irregular menstrual cycle 06/21/2010       Past Medical History:    Anesthesia complication    Human papilloma virus infection    Hx of motion sickness    Migraines    PONV (postoperative nausea and vomiting)       Past Surgical History:   Procedure Laterality Date    Breast surgery  2024    Cholecystectomy  2015    Create eardrum opening,gen anesth Bilateral     as a child    Lumpectomy left Left 01/2024    benign results    Tonsillectomy  2008       Medications Prior to Admission   Medication Sig Dispense Refill Last Dose    Ascorbic Acid (VITAMIN C) 100 MG Oral Tab Take 1 tablet (100 mg total) by mouth daily.   6/17/2024    acetaminophen 500 MG Oral Tab Take 1 tablet (500 mg total) by mouth every 6 (six) hours as needed for Pain.   More than a month    BIOTIN OR Take 1 tablet by mouth daily.   6/17/2024    Multiple Vitamin (MULTIVITAMIN ADULT OR) Take 1 tablet by mouth daily.   6/17/2024    APPLE CIDER VINEGAR OR Take 1 Dose by mouth daily.   6/17/2024     Current Facility-Administered Medications Ordered in Epic    Medication Dose Route Frequency Provider Last Rate Last Admin    lactated ringers infusion   Intravenous Continuous Georgina Marsh MD 20 mL/hr at 06/24/24 0817 New Bag at 06/24/24 0817     No current [facility]-ordered outpatient medications on file.       No Known Allergies    Family History   Problem Relation Age of Onset    Hypertension Father     Hypertension Mother     BRCA gene + Paternal Grandmother     Breast Cancer Paternal Grandmother         unknown    BRCA gene + Maternal Aunt     BRCA gene + Paternal Cousin Female      Social History     Socioeconomic History    Marital status:    Tobacco Use    Smoking status: Never     Passive exposure: Never    Smokeless tobacco: Never   Vaping Use    Vaping status: Never Used   Substance and Sexual Activity    Alcohol use: Yes     Alcohol/week: 3.0 standard drinks of alcohol     Types: 3 Glasses of wine per week     Comment: social- pt instructed to stop drinking 24 hrs prior to surgery    Drug use: Never       Available pre-op labs reviewed.  Lab Results   Component Value Date    URINEPREG Negative 06/24/2024             Vital Signs:  Body mass index is 29.38 kg/m².   height is 1.676 m (5' 6\") and weight is 82.6 kg (182 lb). Her oral temperature is 98 °F (36.7 °C). Her blood pressure is 121/76 and her pulse is 79. Her respiration is 18 and oxygen saturation is 100%.   Vitals:    06/17/24 1057 06/24/24 0814   BP:  121/76   Pulse:  79   Resp:  18   Temp:  98 °F (36.7 °C)   TempSrc:  Oral   SpO2:  100%   Weight: 83.9 kg (185 lb) 82.6 kg (182 lb)   Height: 1.676 m (5' 6\") 1.676 m (5' 6\")        Anesthesia Evaluation     Patient summary reviewed and Nursing notes reviewed    History of anesthetic complications   Airway   Mallampati: I  TM distance: >3 FB  Neck ROM: full  Dental      Pulmonary - negative ROS and normal exam   Cardiovascular - negative ROS and normal exam    Neuro/Psych - negative ROS     GI/Hepatic/Renal - negative ROS     Endo/Other    Abdominal                   Anesthesia Plan:   ASA:  1  Plan:   MAC  Informed Consent Plan and Risks Discussed With:  Patient      I have informed Cindy MURRAY Gunty and/or legal guardian or family member of the nature of the anesthetic plan, benefits, risks including possible dental damage if relevant, major complications, and any alternative forms of anesthetic management.   All of the patient's questions were answered to the best of my ability. The patient desires the anesthetic management as planned.  Adan Mi MD  6/24/2024 8:59 AM  Present on Admission:  **None**

## 2024-06-24 NOTE — OPERATIVE REPORT
[facility] OPERATING ROOM  Operative Note     Cindy Carrillo Location: OR   CSN [id number] MRN [id number]   Admission Date 6/24/2024 Operation Date 6/24/2024   Attending Physician Georgina Marsh MD Operating Physician Georgina Marsh MD      Preoperative Diagnosis: Menorrhagia with regular cycle [N92.0]     Postoperative Diagnosis: Menorrhagia with regular cycle [N92.0]     Procedure Performed:   Hysteroscopy with endometrial ablation     Primary Surgeon: Georgina Marsh MD      Assistant: none      Surgical Findings: thickened endometrium      Anesthesia: MAC     Complications: none      Implants: * No implants in log *     Specimen: endometrial curetting      Drains: none     Condition:  stable      Estimated Blood Loss: Blood Output: 2 mL (6/24/2024  9:56 AM)       Summary of Case:   The patient was taken to the operating room.  She was then prepped and draped in the normal sterile fashion. She was placed in the dorsal lithotomy position using the Humble stirrups, correct positioning was verified twice.  She was then prepped and draped in the normal sterile fashion. Time out procedure was done      A weighted vaginal speculum was placed the anterior lip of the cervix was grasped with a tenaculum.  The hysteroscope was placed into endocervical canal and under direct  visualization the uterine cavity was entered.  The uterine cavity was inspected and polyps were seen.  Hysteroscopy was removed and a D & C was done with bear flower - all specimens sent to lab.  Fluid deficit was 15 cc    The Novasure device was then use for the endometrial ablation.  The uterine cavity length was 6.5 cm and width was 4.0 cm.  After initial perforation test was passed, the ablation was done a full cycle.   The device was removed after procedure complete.    The tenaculum was remove, site was hemostatic.  Patient was taken out of dorsal lithotomy position, awaken and taken to [facility] in stable condition.  Post procedure  time out was done. All OR counts were correct.      Georgina Marsh MD  6/24/2024  10:01 AM

## 2024-06-24 NOTE — INTERVAL H&P NOTE
Pre-op Diagnosis: Menorrhagia with regular cycle [N92.0]    The above referenced H&P was reviewed by Georgina Marsh MD on 6/24/2024, the patient was examined and no significant changes have occurred in the patient's condition since the H&P was performed.  I discussed with the patient and/or legal representative the potential benefits, risks and side effects of this procedure; the likelihood of the patient achieving goals; and potential problems that might occur during recuperation.  I discussed reasonable alternatives to the procedure, including risks, benefits and side effects related to the alternatives and risks related to not receiving this procedure.  We will proceed with procedure as planned.

## 2024-06-26 ENCOUNTER — TELEPHONE (OUTPATIENT)
Dept: OBGYN CLINIC | Facility: CLINIC | Age: 39
End: 2024-06-26

## 2024-06-26 NOTE — TELEPHONE ENCOUNTER
Name and Date of Birth verified    Patient scheduled for 2 week follow up, advised not to swim until follow up visit completed.

## 2024-06-26 NOTE — TELEPHONE ENCOUNTER
Patient had oblation done on Monday 6/24 and hoping to get post op appointment with clearance before the 4th of July to go swimming.    Pls advise

## 2024-07-09 ENCOUNTER — OFFICE VISIT (OUTPATIENT)
Dept: OBGYN CLINIC | Facility: CLINIC | Age: 39
End: 2024-07-09

## 2024-07-09 DIAGNOSIS — N92.1 MENORRHAGIA WITH IRREGULAR CYCLE: Primary | ICD-10-CM

## 2024-07-09 PROCEDURE — 99213 OFFICE O/P EST LOW 20 MIN: CPT | Performed by: OBSTETRICS & GYNECOLOGY

## 2024-07-09 NOTE — PROGRESS NOTES
Post-op follow up.      Visit Type:  Post-operative follow-up    Date of Procedure:  06/24/2024    Procedure:  Hysteroscopy with endometrial ablation    Indications for Procedure:  Menorrhagia with regular cycle    Pathology Report:  benign    Surgery/Post-op Complications: no    Pain:  none    Medications pertaining to Surgery:  no    Incision (if applicable):  n/a    Diet: normal    Voiding:  normal    Bowel movements/Flatus:  normal    Activity: normal    Problems:  no concerns    Ht (P) 5' 6\" (1.676 m)   Wt (P) 186 lb (84.4 kg)   LMP 06/03/2024 (Exact Date)     Wt Readings from Last 3 Encounters:   07/09/24 (P) 186 lb (84.4 kg)   06/24/24 182 lb (82.6 kg)   06/06/24 185 lb 13.6 oz (84.3 kg)       Health Maintenance   Topic Date Due    Annual Physical  Never done    COVID-19 Vaccine (4 - 2023-24 season) 09/01/2023    Influenza Vaccine (1) 10/01/2024    Mammogram  01/10/2025    DTaP,Tdap,and Td Vaccines (4 - Td or Tdap) 01/12/2025    Pap Smear  06/06/2027    Annual Depression Screening  Completed    Pneumococcal Vaccine: Birth to 64yrs  Aged Out         Review of Systems   General: Present- Feeling well. Not Present- Fever.  Female Genitourinary: Not Present- Dysmenorrhea, Dyspareunia, Flank Pain, Frequency, Menstrual Irregularities, Pelvic Pain, Urgency, Urinary Complaints, Vaginal Bleeding and Vaginal dryness.  Pain: Present- Pain Rating - 0 on a 0-10 scale.  All other systems negative       Physical Exam   The physical exam findings are as follows:     Abdomen   Inspection: - Inspection Normal.  Palpation/Percussion: Palpation and Percussion of the abdomen reveal - Non Tender, No hepatosplenomegaly and No Palpable abdominal masses.    Female Genitourinary     External Genitalia   Perineum - Normal. Bartholin's Gland - Bilateral - Normal. Clitoris - Normal.  Introitus: Characteristics - Normal. Discharge - None.  Labia Majora: Lesions - Bilateral - None. Characteristics - Bilateral - Normal.  Labia Minora:  Lesions - Bilateral - None. Characteristics - Bilateral - Normal.  Urethra: Characteristics - Normal. Discharge - None.  [place] Gland - Bilateral - Normal.  Vulva: Characteristics - Normal. Lesions - None.    Speculum & Bimanual   Vagina:   Vaginal Wall: - Normal.  Vaginal Lesions - None. Vaginal Mucosa - Normal.  Cervix: Characteristics - No Motion tenderness. Discharge - None.  Uterus: Characteristics - Non Tender. Position - Midposition.  Adnexa: Characteristics - Bilateral - Tender. Masses - No Adnexal Masses.  Bladder - Normal.      Rectal   Anorectal Exam: External - normal external exam.    Lymphatic  General Lymphatics   Description - Normal .    Path reviewed - may return to normal activity     1. Menorrhagia with irregular cycle

## 2024-09-12 ENCOUNTER — HOSPITAL ENCOUNTER (OUTPATIENT)
Dept: MAMMOGRAPHY | Facility: HOSPITAL | Age: 39
Discharge: HOME | End: 2024-09-12
Attending: SURGERY
Payer: COMMERCIAL

## 2024-09-12 DIAGNOSIS — N60.92 ATYPICAL DUCTAL HYPERPLASIA OF LEFT BREAST: ICD-10-CM

## 2024-09-12 PROCEDURE — 77062 BREAST TOMOSYNTHESIS BI: CPT | Performed by: SURGERY

## 2024-09-12 PROCEDURE — 77066 DX MAMMO INCL CAD BI: CPT | Performed by: SURGERY

## 2024-11-15 DIAGNOSIS — Z80.3 FAMILY HISTORY OF BREAST CANCER: ICD-10-CM

## 2024-11-15 DIAGNOSIS — N60.92 ATYPICAL DUCTAL HYPERPLASIA OF LEFT BREAST: Primary | ICD-10-CM

## 2024-11-15 DIAGNOSIS — Z91.89 AT HIGH RISK FOR BREAST CANCER: ICD-10-CM

## 2025-02-18 ENCOUNTER — HOSPITAL ENCOUNTER (OUTPATIENT)
Dept: MRI IMAGING | Facility: HOSPITAL | Age: 40
Discharge: HOME | End: 2025-02-18
Payer: COMMERCIAL

## 2025-02-18 DIAGNOSIS — N60.92 ATYPICAL DUCTAL HYPERPLASIA OF LEFT BREAST: ICD-10-CM

## 2025-02-18 DIAGNOSIS — Z80.3 FAMILY HISTORY OF BREAST CANCER: ICD-10-CM

## 2025-02-18 DIAGNOSIS — Z91.89 AT HIGH RISK FOR BREAST CANCER: ICD-10-CM

## 2025-02-18 PROCEDURE — 77049 MRI BREAST C-+ W/CAD BI: CPT

## 2025-02-18 PROCEDURE — A9575 INJ GADOTERATE MEGLUMI 0.1ML: HCPCS

## 2025-02-18 RX ORDER — GADOTERATE MEGLUMINE 376.9 MG/ML
15 INJECTION INTRAVENOUS
Status: COMPLETED | OUTPATIENT
Start: 2025-02-18 | End: 2025-02-18

## 2025-02-18 RX ADMIN — GADOTERATE MEGLUMINE 15 ML: 376.9 INJECTION INTRAVENOUS at 19:00:00

## (undated) DEVICE — UNDERPAD INCONT W23XL36IN STD BLU POLYPR BK

## (undated) DEVICE — PACK CDS BREAST-HERNIA-PORT

## (undated) DEVICE — 1016 S-DRAPE IRRIG POUCH 10/BOX: Brand: STERI-DRAPE™

## (undated) DEVICE — SUTURE PERMA- 2-0 18IN NABSRB BLK 26MM FS

## (undated) DEVICE — GLOVE SUR 6.5 SENSICARE PI PIP CRM PWD F

## (undated) DEVICE — SLEEVE COMPR M KNEE LEN SGL USE KENDALL SCD

## (undated) DEVICE — BRA SURGICAL ELIZABETH PINK XL

## (undated) DEVICE — DRAPE PACK CHEST

## (undated) DEVICE — GOWN SURGICAL LARGE

## (undated) DEVICE — SUTURE MCRYL 4-0 18IN ABSRB UD 19MM PS-2 3/8

## (undated) DEVICE — HYSTEROSCOPY: Brand: MEDLINE INDUSTRIES, INC.

## (undated) DEVICE — Device

## (undated) DEVICE — POUCH INSTR W11XL7IN TRNSPAR PLAS 2 COMPT

## (undated) DEVICE — GLOVE SUR 6.5 SENSICARE PIP WHT PWD F

## (undated) DEVICE — HANDPIECE ABLAT DIA6MM ENDOMET IMPED CTRL DEV

## (undated) DEVICE — PAD ABD W7.5XL8IN GEN USE NONADHESIVE EXTRA

## (undated) DEVICE — SUTURE ETHLN 3-0 18IN NABSRB BLK 19MM PS-2

## (undated) DEVICE — MEDI-VAC NON-CONDUCTIVE SUCTION TUBING: Brand: CARDINAL HEALTH

## (undated) DEVICE — DRAPE,LITHOTOMY,STERILE: Brand: MEDLINE

## (undated) DEVICE — CLIP SUR SM TI HRT SHP WIRE HORZ LIG SYS

## (undated) DEVICE — SUTURE COAT VCRL 3-0 27IN ABSRB UD 26MM SH

## (undated) DEVICE — PAD,NON-ADHERENT,3X8,STERILE,LF,1/PK: Brand: MEDLINE

## (undated) DEVICE — SOLUTION IRRIG 1000ML 0.9% NACL USP BTL

## (undated) DEVICE — COVER LT HNDL RIG FOR SUR CAM DISP

## (undated) DEVICE — SOLUTION IRRIG 3000ML 0.9% NACL FLX CONT

## (undated) DEVICE — CLIP LIG M BLU TI HRT SHP WIRE HORZ

## (undated) DEVICE — ELECTRODE ES 2.75IN PTFE BLDE MOD E-Z CLN